# Patient Record
Sex: FEMALE | Race: WHITE | NOT HISPANIC OR LATINO | Employment: FULL TIME | URBAN - NONMETROPOLITAN AREA
[De-identification: names, ages, dates, MRNs, and addresses within clinical notes are randomized per-mention and may not be internally consistent; named-entity substitution may affect disease eponyms.]

---

## 2021-11-24 ENCOUNTER — OFFICE VISIT (OUTPATIENT)
Dept: ORTHOPEDIC SURGERY | Facility: CLINIC | Age: 61
End: 2021-11-24

## 2021-11-24 VITALS — WEIGHT: 149.9 LBS | HEIGHT: 63 IN | BODY MASS INDEX: 26.56 KG/M2

## 2021-11-24 DIAGNOSIS — M54.50 LOW BACK PAIN, UNSPECIFIED BACK PAIN LATERALITY, UNSPECIFIED CHRONICITY, UNSPECIFIED WHETHER SCIATICA PRESENT: ICD-10-CM

## 2021-11-24 DIAGNOSIS — G89.29 CHRONIC PAIN OF RIGHT KNEE: Primary | ICD-10-CM

## 2021-11-24 DIAGNOSIS — M25.561 CHRONIC PAIN OF RIGHT KNEE: Primary | ICD-10-CM

## 2021-11-24 PROCEDURE — 99203 OFFICE O/P NEW LOW 30 MIN: CPT | Performed by: ORTHOPAEDIC SURGERY

## 2021-11-24 NOTE — PROGRESS NOTES
Haily Richards is a 61 y.o. female   Primary provider:  Provider, No Known       Chief Complaint   Patient presents with   • Right Knee - Pain       HISTORY OF PRESENT ILLNESS: Patient is here today for right knee pain. She had xrays prior to visit. She states that her pain is 4/10.    61 F with 2 months acute on chronic R knee pain of insidious onset.  Symptoms initially started about 1 year ago, but have getting worse over the last 2 months.  +Swelling.  Pt describes sxs of B, L>R, lumbar radiculopathy or lumbar spinal stenosis which has led her to favor the R leg and which she thinks is probably contributing to the knee pain.  She gets pain across her lower back and running into both legs, with associated numbness and weakness.  +NSAIDs.  No PT.  No CSIs.  No sx.    Pain  This is a chronic problem. The current episode started more than 1 year ago. The problem occurs constantly. The problem has been gradually worsening. Associated symptoms include arthralgias, joint swelling and numbness. The symptoms are aggravated by walking, standing and exertion (Sitting/Driving). She has tried rest and NSAIDs for the symptoms. The treatment provided mild relief.        CONCURRENT MEDICAL HISTORY:    No past medical history on file.    Allergies   Allergen Reactions   • Codeine GI Intolerance         Current Outpatient Medications:   •  diclofenac (VOLTAREN) 50 MG EC tablet, Take 1 tablet by mouth 2 (Two) Times a Day As Needed (right knee pain)., Disp: 30 tablet, Rfl: 0    No past surgical history on file.    No family history on file.    Social History     Socioeconomic History   • Marital status: Single   Tobacco Use   • Smoking status: Current Every Day Smoker   • Smokeless tobacco: Never Used        Review of Systems   Constitutional: Negative.    HENT: Negative.    Eyes: Negative.    Respiratory: Negative.    Cardiovascular: Negative.    Gastrointestinal: Negative.    Endocrine: Negative.    Genitourinary: Negative.   "  Musculoskeletal: Positive for arthralgias and joint swelling.   Skin: Negative.    Allergic/Immunologic: Negative.    Neurological: Positive for numbness.        Tingling   Hematological: Negative.    Psychiatric/Behavioral: Positive for sleep disturbance.        Anxiety/Depression       PHYSICAL EXAMINATION:       Ht 160 cm (63\")   Wt 68 kg (149 lb 14.4 oz)   BMI 26.55 kg/m²     Physical Exam    GAIT:     []  Normal  []  Antalgic    Assistive device: []  None  []  Walker     []  Crutches  []  Cane     []  Wheelchair  []  Stretcher    Ortho Exam  NAD  R knee:  +Effusion.  +MJLT.  +Anterolateral/anteromedial joint line TTP.  0-130.  Stable to v/v stress.  Negative Lachman.  Negative Adrienne.        XR Knee 1 or 2 View Right    Result Date: 11/18/2021  Narrative: EXAM: XR KNEE 1-2 VIEWS COMPARISON: None INDICATION: pain, swelling, M25.561 Pain in right knee FINDINGS: Two-view right knee. No acute fracture or dislocation. No suspicious osseous lesion. Medial compartment joint space narrowing. Osteophytes of the tibial plateaus and medial femoral condyle. Small patellar osteophytes. No large joint effusion. Atherosclerosis of the posterior vasculature.     Impression: No acute osseous abnormality. Tricompartmental osteoarthritis of the right knee, most significant in the medial compartment. Right knee atherosclerosis. Electronically signed by:  William Carpio MD  11/18/2021 11:42 AM CST Workstation: [id number]          ASSESSMENT:    Diagnoses and all orders for this visit:    Chronic pain of right knee        PLAN  61 F with 2 months acute on chronic R knee pain and swelling.  XRs demonstrate mild to moderate OA.  This is likely an exacerbation of arthritis, possibly aggravated by worsening lumber stenosis/radiculopathy.  Rec trial of conservative mgmt with ice 20 min BID, NSAIDs, and PT.  Referral placed to PT for ROM, strengthening exercises, and modalities.  If she does not improve after 6-12 weeks of " conservative mgmt, she should RTC and will get an MRI to r/o a concomitant meniscus tear or other internal derangement.  Will also refer her to Spine Surgery for eval and treatment of lumbar stenosis/radiculopathy.  Additional referral placed to Primary Care so she can get plugged in with a regular PCP as she just recently moved here and doesn't currently have a regular doctor.  RTC prn.     No follow-ups on file.    Kaitlynn Hart MA

## 2021-12-01 ENCOUNTER — HOSPITAL ENCOUNTER (OUTPATIENT)
Dept: PHYSICAL THERAPY | Facility: HOSPITAL | Age: 61
Setting detail: THERAPIES SERIES
Discharge: HOME | End: 2021-12-01

## 2021-12-01 DIAGNOSIS — G89.29 CHRONIC PAIN OF RIGHT KNEE: Primary | ICD-10-CM

## 2021-12-01 DIAGNOSIS — M25.561 CHRONIC PAIN OF RIGHT KNEE: Primary | ICD-10-CM

## 2021-12-01 PROCEDURE — 97162 PT EVAL MOD COMPLEX 30 MIN: CPT

## 2021-12-01 NOTE — THERAPY EVALUATION
Outpatient Physical Therapy Ortho Initial Evaluation  [facility]     Patient Name: Haily Richards  : 1960  MRN: [id number]  Today's Date: 2021      Visit Date: 2021   Attendance:  (TBD approved)  Subjective Improvement: n/a  Next MD Visit: JEROD  Recert Date: 2021    Therapy Diagnosis: Chronic R knee pain      Patient Active Problem List   Diagnosis   • Right knee pain        History reviewed. No pertinent past medical history.     No past surgical history on file.     Current Outpatient Medications   Medication Instructions   • diclofenac (VOLTAREN) 50 mg, Oral, 2 Times Daily PRN       Allergies   Allergen Reactions   • Codeine GI Intolerance         Visit Dx:     ICD-10-CM ICD-9-CM   1. Chronic pain of right knee  M25.561 719.46    G89.29 338.29          Patient History     Row Name 21 0800             History    Chief Complaint Pain  -MH      Type of Pain Knee pain  Right  -      Date Current Problem(s) Began --  1 month  -      Brief Description of Current Complaint Pt stated that she started experiencing right knee pain about 1 month ago without any known STEPHY. 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 She notices increased pain with prolonged standing, walking, and while pushing/pulling her cart. 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 She is taking prescription pain medication which is helping. Single female living with her significant other. She has friends nearby who can help out if needed. Pt lives in a single story home with 2 steps to enter.  -      Patient/Caregiver Goals Relieve pain; Return to prior level of function  -      Current Tobacco Use Yes  -      Smoking Status 1 PPD  -      Patient's Rating of General Health Fair  -MH      Hand Dominance left-handed  -      Occupation/sports/leisure activities Occupation: House keeper. Hobbies: “Not anymore”   "-      What clinical tests have you had for this problem? X-ray  -      Results of Clinical Tests Xray on 11/18/2021: “Two-view right knee. No acute fracture or dislocation. No suspicious osseous lesion. Medial compartment joint space narrowing. Osteophytes of the tibial plateaus and medial femoral condyle. Small patellar osteophytes. No large joint effusion. Atherosclerosis of the posterior vasculature.”  -              Pain     Pain Location Knee  Right  -      Pain at Present 6  -      Pain at Best 6  -      Pain at Worst 10  -      Pain Frequency Constant/continuous  -      Pain Description Sharp; Throbbing  \"Like it is going to give out\"  -      What Performance Factors Make the Current Problem(s) WORSE? Walking, standing, stairs, kneeling, pushing/pulling, squatting, getting in/out of car, pressing on gas/break in car  -      What Performance Factors Make the Current Problem(s) BETTER? Pain medication  -      Tolerance Time- Standing 20-30 min  -      Tolerance Time- Walking 30 min  -      Is your sleep disturbed? Yes  -      Is medication used to assist with sleep? No  -      Difficulties at work? Walking, standing, squatting, pushing/pulling  -      Difficulties with ADL's? Dressing, cleaning  -      Difficulties with recreational activities? n/a  -              Fall Risk Assessment    Any falls in the past year: No  -            User Key  (r) = Recorded By, (t) = Taken By, (c) = Cosigned By    Initials Name Provider Type     Diana Delong PT Physical Therapist                 PT Ortho     Row Name 12/01/21 0800       Subjective Comments    Subjective Comments See therapy patient history.  -       Subjective Pain    Able to rate subjective pain? yes  -    Pre-Treatment Pain Level 6  -    Post-Treatment Pain Level 7  -       Posture/Observations    Posture/Observations Comments TTP: medial patella boarder and just medial to patella. Very mild edema medial to " patella. No increase muscle tension. Good skin integrity. No ecchymosis. = weight bearing in standing. Mild crepitus with knee flex and ext. Painful arc.  -       Knee Special Tests    Anterior drawer (ACL lesion) Right:; Negative  -    Posterior drawer (PCL lesion) Right:; Negative  -    Valgus stress (MCL lesion) Right:; Negative  -    Varus stress (LCL lesion) Right:; Negative  -    Thessaly test (meniscal lesion) Right:; Positive  -       General ROM    RT Lower Ext Comment; Rt Knee Extension/Flexion  -    LT Lower Ext Comment  -MH       Right Lower Ext    Rt Knee Extension/Flexion AROM 0-121 deg; pain  -    RT Lower Extremity Comments Hip and ankle WFL.  -       Left Lower Ext    LT Lower Extremity Comments AROM WFL grossly  -       MMT (Manual Muscle Testing)    Rt Lower Ext Comments; Rt Hip Flexion; Rt Hip Extension; Rt Hip ABduction; Rt Hip ADduction; Rt Hip Internal (Medial) Rotation; Rt Hip External (Lateral) Rotation; Rt Knee Extension; Rt Knee Flexion; Rt Ankle Dorsiflexion  -    Lt Lower Ext Comments  -       MMT Right Lower Ext    Rt Hip Flexion MMT, Gross Movement (5/5) normal  -    Rt Hip Extension MMT, Gross Movement (4+/5) good plus  -    Rt Hip ABduction MMT, Gross Movement (5/5) normal  -    Rt Hip ADduction MMT, Gross Movement (4/5) good  pain  -    Rt Hip Internal (Medial) Rotation MMT, Gross Movement (4-/5) good minus  pain  -    Rt Hip External (Lateral) Rotation MMT, Gross Movement (4/5) good  -    Rt Knee Extension MMT, Gross Movement (4+/5) good plus  -    Rt Knee Flexion MMT, Gross Movement (3+/5) fair plus  pain  -    Rt Ankle Dorsiflexion MMT, Gross Movement (5/5) normal  -       MMT Left Lower Ext    Lt Lower Extremity Comments  5/5 grossly  -       Sensation    Sensation WNL? WNL  -    Light Touch No apparent deficits  -       Flexibility    Flexibility Tested? Lower Extremity  -       Lower Extremity Flexibility    Hamstrings  Moderately limited  -    Hip Flexors Mildly limited  -MH    Quadriceps Mildly limited  -MH    Gastrocnemius Mildly limited  -MH       Balance Skills Training    SLS 10 sec with significant increase sway/balance strategies.  -       Gait/Stairs (Locomotion)    Arthur Level (Gait) independent  -    Comment (Gait/Stairs) Antalgic gait with increased lateral sway and decreased stance time on RLE.  -          User Key  (r) = Recorded By, (t) = Taken By, (c) = Cosigned By    Initials Name Provider Type    Diana Gibson PT Physical Therapist                            Therapy Education  Education Details: Findings of evaluation and plan for physical therapy.  How Provided: Verbal  Provided to: Patient  Level of Understanding: Verbalized      PT OP Goals     Row Name 12/01/21 0800          PT Short Term Goals    STG Date to Achieve 12/22/21  -     STG 1 Pt will be independent with HEP for self-management of symptoms.  -     STG 1 Progress New  -     STG 2 Pt will be able to balance in SLS on the R for 10 sec without increased balance strategies/swaying as a measure of improved balance and stability.  -     STG 2 Progress New  [facility]            Long Term Goals    LTG Date to Achieve 01/12/22  -     LTG 1 Pt will report a subjective improvement of at least 80% in symptoms as a measure to return to PLOF.  -     LTG 1 Progress New  [facility]     LTG 2 Pt’ LEFS score will improve by at least 18 points.  -     LTG 2 Progress New  [facility]     LTG 3 Pt’s knee flex/ext MMT will improve to 5/5 as a measure of improved strength.  -     LTG 3 Progress New  [facility]     LTG 4 Pt's right hip MMT will improve to 5/5 grossly as a measure of improved strength.  -     LTG 4 Progress New  [facility]     LTG 5 Pt will be able to push/pull a sled with 40# with correct body mechanics and without increased knee pain.  -     LTG 5 Progress New  [facility]            Time Calculation    PT Goal Re-Cert Due Date 12/22/21  -           User  Key  (r) = Recorded By, (t) = Taken By, (c) = Cosigned By    Initials Name Provider Type     Diana Delong, PT Physical Therapist                 PT Assessment/Plan     Row Name 12/01/21 0800          PT Assessment    Functional Limitations Impaired gait; Impaired locomotion; Limitation in home management; Limitations in community activities; Performance in self-care ADL; Performance in work activities  -     Impairments Balance; Edema; Gait; Impaired flexibility; Impaired muscle endurance; Impaired muscle length; Impaired muscle power; Muscle strength; Pain; Poor body mechanics  -     Assessment Comments Ms. Richards is a 60yo female who presents to physical therapy for right knee pain with no known STEPHY. She is currently experiencing pain/difficulty with walking, standing, squatting, getting in/out of a car, stairs, and pressing on the gas/break in a car. This has affected her ability to complete ADLs, IADLs and work duties. She demonstrates pain, TTP, edema, decreased balance, hip weakness, knee weakness, decreased flexibility, decreased balance, poor body mechanics, gait deviations, and positive special testing for possible meniscus involvement. Ms. Richards would benefit from skilled physical therapy to address the above deficits in order for her to return to her prior level of function and full work duties.  -     Rehab Potential Good  -     Patient/caregiver participated in establishment of treatment plan and goals Yes  -     Patient would benefit from skilled therapy intervention Yes  -            PT Plan    PT Frequency 2x/week  -     Predicted Duration of Therapy Intervention (PT) 4-6 weeks  -     Planned CPT's? PT EVAL MOD COMPLELITY: 19298; PT RE-EVAL: 03196; PT THER PROC EA 15 MIN: 82081; PT THER ACT EA 15 MIN: 75183; PT MANUAL THERAPY EA 15 MIN: 93297; PT NEUROMUSC RE-EDUCATION EA 15 MIN: 09841; PT GAIT TRAINING EA 15 MIN: 25208; PT SELF CARE/HOME MGMT/TRAIN EA 15: 34645; PT ELECTRICAL  STIM UNATTEND: ; PT ULTRASOUND EA 15 MIN: 94898; PT HOT/COLD PACK WC NONMCARE: 10359; PT INITIAL ORTHOTIC MGMT/TRAIN EA 15 MIN: 89289; PT SUBSEQUENT ORTHOTIC/PROSTHETIC TRAIN: 33206; PT SELF CARE/MGMT/TRAIN 15 MIN: 59668; PT THER SUPP EA 15 MIN  -     PT Plan Comments Hip strength, knee strength, stretching, balance, gait, body mechanics with sled push/pull and squatting, modalities and manual as needed.  -           User Key  (r) = Recorded By, (t) = Taken By, (c) = Cosigned By    Initials Name Provider Type    Diana Gibson PT Physical Therapist                   OP Exercises     Row Name 12/01/21 0800             Subjective Comments    Subjective Comments See therapy patient history.  -              Subjective Pain    Able to rate subjective pain? yes  -      Pre-Treatment Pain Level 6  -      Post-Treatment Pain Level 7  -            User Key  (r) = Recorded By, (t) = Taken By, (c) = Cosigned By    Initials Name Provider Type    Diana Gibson PT Physical Therapist                              Outcome Measure Options: Lower Extremity Functional Scale (LEFS)  Lower Extremity Functional Index  Any of your usual work, housework or school activities: Quite a bit of difficulty  Your usual hobbies, recreational or sporting activities: Moderate difficulty  Getting into or out of the bath: A little bit of difficulty  Walking between rooms: A little bit of difficulty  Putting on your shoes or socks: No difficulty  Squatting: Moderate difficulty  Lifting an object, like a bag of groceries from the floor: Moderate difficulty  Performing light activities around your home: Moderate difficulty  Performing heavy activities around your home: Quite a bit of difficulty  Getting into or out of a car: Quite a bit of difficulty  Walking 2 blocks: Quite a bit of difficulty  Walking a mile: Extreme difficulty or unable to perform activity  Going up or down 10 stairs (about 1 flight of stairs): Extreme  difficulty or unable to perform activity  Standing for 1 hour: Quite a bit of difficulty  Sitting for 1 hour: Quite a bit of difficulty  Running on even ground: Extreme difficulty or unable to perform activity  Running on uneven ground: Extreme difficulty or unable to perform activity  Making sharp turns while running fast: Quite a bit of difficulty  Hopping: Moderate difficulty  Rolling over in bed: Extreme difficulty or unable to perform activity  Total: 27      Time Calculation:     Start Time: 0845  Stop Time: 0925  Time Calculation (min): 40 min  Untimed Charges  PT Eval/Re-eval Minutes: 40  Total Minutes  Untimed Charges Total Minutes: 40   Total Minutes: 40     Therapy Charges for Today     Code Description Service Date Service Provider Modifiers Qty    61243071679 HC PT EVAL MOD COMPLEXITY 3 12/1/2021 Diana Delong, PT GP 1          PT G-Codes  Outcome Measure Options: Lower Extremity Functional Scale (LEFS)  Total: 27         Diana Delong PT  12/1/2021

## 2021-12-08 ENCOUNTER — HOSPITAL ENCOUNTER (OUTPATIENT)
Dept: PHYSICAL THERAPY | Facility: HOSPITAL | Age: 61
Setting detail: THERAPIES SERIES
Discharge: HOME | End: 2021-12-08

## 2021-12-08 DIAGNOSIS — G89.29 CHRONIC PAIN OF RIGHT KNEE: Primary | ICD-10-CM

## 2021-12-08 DIAGNOSIS — M25.561 CHRONIC PAIN OF RIGHT KNEE: Primary | ICD-10-CM

## 2021-12-08 PROCEDURE — 97110 THERAPEUTIC EXERCISES: CPT | Performed by: PHYSICAL THERAPIST

## 2021-12-08 NOTE — THERAPY TREATMENT NOTE
Outpatient Physical Therapy Ortho Treatment Note  [facility]     Patient Name: Haily Richards  : 1960  MRN: [id number]  Today's Date: 2021      Visit Date: 2021  Attendance: 2/2 (TBD approved)  Subjective Improvement: n/a  Next MD Visit: JEROD  Recert Date: 2021     Therapy Diagnosis: Chronic R knee pain  Visit Dx:    ICD-10-CM ICD-9-CM   1. Chronic pain of right knee  M25.561 719.46    G89.29 338.29       Patient Active Problem List   Diagnosis   • Right knee pain        No past medical history on file.     No past surgical history on file.     PT Ortho     Row Name 21 1520       Subjective Comments    Subjective Comments Off work today as a  at [email or web address]  -BS       Precautions and Contraindications    Precautions/Limitations no known precautions/limitations  -BS       Subjective Pain    Able to rate subjective pain? yes  -BS    Pre-Treatment Pain Level 3  -BS    Post-Treatment Pain Level 0  -BS          User Key  (r) = Recorded By, (t) = Taken By, (c) = Cosigned By    Initials Name Provider Type    Jovany More, PT Physical Therapist                             PT Assessment/Plan     Row Name 21 1520          PT Assessment    Assessment Comments Reduced R knee pain post tx, limited by fatigue and pain  -BS            PT Plan    PT Frequency 2x/week  -BS     Predicted Duration of Therapy Intervention (PT) 4-6 weeks  -BS     PT Plan Comments advance knee strengthening as able. Step ups, minis squats, SLS, standing knee bends.  -BS           User Key  (r) = Recorded By, (t) = Taken By, (c) = Cosigned By    Initials Name Provider Type    Jovany More, PT Physical Therapist                   OP Exercises     Row Name 21 1520             Subjective Comments    Subjective Comments Off work today as a  at [employer]  -BS              Subjective Pain    Able to rate subjective pain? yes  -BS      Pre-Treatment Pain  "Level 3  -BS      Post-Treatment Pain Level 0  -BS              Exercise 1    Exercise Name 1 Pro II, L2  -BS      Time 1 10'  -BS              Exercise 2    Exercise Name 2 Standing HS S, bilat  -BS      Sets 2 1  -BS      Reps 2 2  -BS      Time 2 30\" hold  -BS              Exercise 3    Exercise Name 3 prone knee bends, B  -BS      Sets 3 1  -BS      Reps 3 20  -BS      Additional Comments in MARYSE position w/ pillow under R knee  -BS              Exercise 4    Exercise Name 4 sit to stand, no UE A  -BS      Sets 4 1  -BS      Reps 4 10  -BS              Exercise 5    Exercise Name 5 R LAQ's  -BS      Sets 5 1  -BS      Reps 5 15  -BS              Exercise 6    Exercise Name 6 seated resisted R hamstring curls w/ green TB  -BS      Sets 6 1  -BS      Reps 6 15  -BS            User Key  (r) = Recorded By, (t) = Taken By, (c) = Cosigned By    Initials Name Provider Type    Jovany More, PT Physical Therapist                              PT OP Goals     Row Name 12/08/21 1520 12/08/21 1500       PT Short Term Goals    STG Date to Achieve 12/22/21  -BS --    STG 1 Pt will be independent with HEP for self-management of symptoms.  -BS --    STG 1 Progress Ongoing  -BS --    STG 2 Pt will be able to balance in SLS on the R for 10 sec without increased balance strategies/swaying as a measure of improved balance and stability.  -BS --    STG 2 Progress Ongoing  -BS --       Long Term Goals    LTG Date to Achieve 01/12/22  -BS --    LTG 1 Pt will report a subjective improvement of at least 80% in symptoms as a measure to return to PLOF.  -BS --    LTG 1 Progress Ongoing  -BS --    LTG 2 Pt’ LEFS score will improve by at least 18 points.  -BS --    LTG 2 Progress Ongoing  -BS --    LTG 3 Pt’s knee flex/ext MMT will improve to 5/5 as a measure of improved strength.  -BS --    LTG 3 Progress Ongoing  -BS --    LTG 4 Pt's right hip MMT will improve to 5/5 grossly as a measure of improved strength.  -BS --    LTG 4 Progress " Ongoing  -BS --    LTG 5 Pt will be able to push/pull a sled with 40# with correct body mechanics and without increased knee pain.  -BS --    LTG 5 Progress Ongoing  -BS --       Time Calculation    PT Goal Re-Cert Due Date 12/22/21  -BS 12/22/21  -BS          User Key  (r) = Recorded By, (t) = Taken By, (c) = Cosigned By    Initials Name Provider Type    Jovany More, PT Physical Therapist                               Time Calculation:   Start Time: 1520  Stop Time: 1604  Time Calculation (min): 44 min  Total Timed Code Minutes- PT: 44 minute(s)  Therapy Charges for Today     Code Description Service Date Service Provider Modifiers Qty    92076663897 HC PT THER PROC EA 15 MIN 12/8/2021 Jovany Tim, PT GP 3                    Jovany Tim, PT  12/8/2021

## 2021-12-15 ENCOUNTER — APPOINTMENT (OUTPATIENT)
Dept: PHYSICAL THERAPY | Facility: HOSPITAL | Age: 61
End: 2021-12-15

## 2021-12-22 ENCOUNTER — HOSPITAL ENCOUNTER (OUTPATIENT)
Dept: PHYSICAL THERAPY | Facility: HOSPITAL | Age: 61
Setting detail: THERAPIES SERIES
Discharge: HOME | End: 2021-12-22

## 2021-12-22 DIAGNOSIS — M25.561 CHRONIC PAIN OF RIGHT KNEE: Primary | ICD-10-CM

## 2021-12-22 DIAGNOSIS — G89.29 CHRONIC PAIN OF RIGHT KNEE: Primary | ICD-10-CM

## 2021-12-22 PROCEDURE — 97110 THERAPEUTIC EXERCISES: CPT

## 2021-12-22 PROCEDURE — G0283 ELEC STIM OTHER THAN WOUND: HCPCS

## 2021-12-22 NOTE — THERAPY TREATMENT NOTE
Outpatient Physical Therapy Ortho Treatment Note  [facility]     Patient Name: Haily Richards  : 1960  MRN: [id number]  Today's Date: 2021      Visit Date: 2021     Attendance: 3/3 (9 total)  Subjective Improvement: 0%  Next MD Visit: None Scheduled at this time  Recert Date: 2021     Therapy Diagnosis: Chronic R knee pain    Visit Dx:    ICD-10-CM ICD-9-CM   1. Chronic pain of right knee  M25.561 719.46    G89.29 338.29       Patient Active Problem List   Diagnosis   • Right knee pain        No past medical history on file.     No past surgical history on file.     PT Ortho     Row Name 21 1300       Precautions and Contraindications    Precautions/Limitations no known precautions/limitations  -       Posture/Observations    Posture/Observations Comments gait non antalgic  -          User Key  (r) = Recorded By, (t) = Taken By, (c) = Cosigned By    Initials Name Provider Type     Aida Hart PTA Physical Therapy Assistant                             PT Assessment/Plan     Row Name 21 1300          PT Assessment    Functional Limitations Impaired gait; Impaired locomotion; Limitation in home management; Limitations in community activities; Performance in self-care ADL; Performance in work activities  -     Impairments Balance; Edema; Gait; Impaired flexibility; Impaired muscle endurance; Impaired muscle length; Impaired muscle power; Muscle strength; Pain; Poor body mechanics  -     Assessment Comments increased HEP this date; given written instruction for HEP; did well with all exercises but did have some pain after stretching; initated iFC to help with pain and pt seemed to like;'  -     Rehab Potential Good  -     Patient/caregiver participated in establishment of treatment plan and goals Yes  -     Patient would benefit from skilled therapy intervention Yes  -            PT Plan    PT Frequency 2x/week  -     Predicted Duration of Therapy  "Intervention (PT) 4-6 weeks  -     PT Plan Comments COntinue with current POC: progressing hip and knee strength as able. CKC as pain allows; modalities as benefical  -           User Key  (r) = Recorded By, (t) = Taken By, (c) = Cosigned By    Initials Name Provider Type    Aida Cunha PTA Physical Therapy Assistant                 Modalities     Row Name 12/22/21 1300             Subjective Pain    Post-Treatment Pain Level 0  -KH              Ice    Ice Applied Yes  -KH      Location R knee with IFC  -      Ice S/P Rx Yes  -KH              ELECTRICAL STIMULATION    Attended/Unattended Unattended  -      Stimulation Type IFC  -      Location/Electrode Placement/Other R knee with ICE  -KH      PT E-Stim Unattended Minutes 15  -KH            User Key  (r) = Recorded By, (t) = Taken By, (c) = Cosigned By    Initials Name Provider Type    Aida Cunha PTA Physical Therapy Assistant               OP Exercises     Row Name 12/22/21 1300             Subjective Comments    Subjective Comments Patient reports that she was hurting really bad this morning; 9/10 probably because of the weather but not as bad now;  -KH              Subjective Pain    Able to rate subjective pain? yes  -KH      Pre-Treatment Pain Level 7  -KH      Post-Treatment Pain Level 0  -KH              Exercise 1    Exercise Name 1 pro ll LE strength  -KH      Time 1 10 mins  -KH      Additional Comments level 2; seat 7  -KH              Exercise 2    Exercise Name 2 quad sets  -KH      Sets 2 2  -KH      Reps 2 10  -KH      Time 2 3\" holds  -KH              Exercise 3    Exercise Name 3 ham sets  -KH      Sets 3 2  -KH      Reps 3 10  -KH      Time 3 3\" holds  -KH              Exercise 4    Exercise Name 4 SLR flexion  -KH      Sets 4 2  -KH      Reps 4 10  -KH              Exercise 5    Exercise Name 5 clam shells  -KH      Sets 5 2  -KH      Reps 5 10  -KH              Exercise 6    Exercise Name 6 reverse clam shells  -KH      Sets 6 " "2  -KH      Reps 6 10  -KH              Exercise 7    Exercise Name 7 bridges  -KH      Sets 7 2  -KH      Reps 7 10  -KH              Exercise 8    Exercise Name 8 incline stretch  -KH      Sets 8 3  -KH      Time 8 30\" holds  -KH              Exercise 9    Exercise Name 9 standing B hamstring stretch  -KH      Sets 9 3  -KH      Time 9 30\" holds  -KH            User Key  (r) = Recorded By, (t) = Taken By, (c) = Cosigned By    Initials Name Provider Type    Aida Cunha PTA Physical Therapy Assistant                              PT OP Goals     Row Name 12/22/21 1300          PT Short Term Goals    STG Date to Achieve 12/22/21  -     STG 1 Pt will be independent with HEP for self-management of symptoms.  -     STG 1 Progress Ongoing  -     STG 2 Pt will be able to balance in SLS on the R for 10 sec without increased balance strategies/swaying as a measure of improved balance and stability.  -     STG 2 Progress Ongoing  -            Long Term Goals    LTG Date to Achieve 01/12/22  -     LTG 1 Pt will report a subjective improvement of at least 80% in symptoms as a measure to return to PLOF.  -     LTG 1 Progress Ongoing  -     LTG 2 Pt’ LEFS score will improve by at least 18 points.  -     LTG 2 Progress Ongoing  -     LTG 3 Pt’s knee flex/ext MMT will improve to 5/5 as a measure of improved strength.  -     LTG 3 Progress Ongoing  -     LTG 4 Pt's right hip MMT will improve to 5/5 grossly as a measure of improved strength.  -     LTG 4 Progress Ongoing  -     LTG 5 Pt will be able to push/pull a sled with 40# with correct body mechanics and without increased knee pain.  -     LTG 5 Progress Ongoing  -            Time Calculation    PT Goal Re-Cert Due Date 12/22/21  -           User Key  (r) = Recorded By, (t) = Taken By, (c) = Cosigned By    Initials Name Provider Type    Aida Cunha PTA Physical Therapy Assistant                               Time Calculation:   Start " Time: 1300  Stop Time: 1355  Time Calculation (min): 55 min  Untimed Charges  PT E-Stim Unattended Minutes: 15  Total Minutes  Untimed Charges Total Minutes: 15   Total Minutes: 15  Therapy Charges for Today     Code Description Service Date Service Provider Modifiers Qty    18436738224 HC PT THER SUPP EA 15 MIN 12/22/2021 Aida Hart, PTA GP 1    31837271755 HC PT ELECTRICAL STIM UNATTENDED 12/22/2021 Aida Hart, PTA  1    01643733720 HC PT THER PROC EA 15 MIN 12/22/2021 Aida Hart PTA GP 3                    Aida Hart PTA  12/22/2021

## 2021-12-29 ENCOUNTER — HOSPITAL ENCOUNTER (OUTPATIENT)
Dept: PHYSICAL THERAPY | Facility: HOSPITAL | Age: 61
Setting detail: THERAPIES SERIES
Discharge: HOME | End: 2021-12-29

## 2021-12-29 DIAGNOSIS — M25.561 CHRONIC PAIN OF RIGHT KNEE: Primary | ICD-10-CM

## 2021-12-29 DIAGNOSIS — G89.29 CHRONIC PAIN OF RIGHT KNEE: Primary | ICD-10-CM

## 2021-12-29 PROCEDURE — 97110 THERAPEUTIC EXERCISES: CPT

## 2021-12-29 PROCEDURE — G0283 ELEC STIM OTHER THAN WOUND: HCPCS

## 2021-12-29 NOTE — THERAPY PROGRESS REPORT/RE-CERT
"    Outpatient Physical Therapy Ortho Progress Note  [facility]     Patient Name: Haily Richards  : 1960  MRN: [id number]  Today's Date: 2021      Visit Date: 2021     ATTENDANCE:   SUBJECTIVE IMPROVEMENT: \"improving\"  NEXT MD APPOINTMENT: none sched at this time.   RECERT DATE:     THERAPY DIAGNOSIS: R knee pain       Visit Dx:    ICD-10-CM ICD-9-CM   1. Chronic pain of right knee  M25.561 719.46    G89.29 338.29       Patient Active Problem List   Diagnosis   • Right knee pain        History reviewed. No pertinent past medical history.     No past surgical history on file.     PT Ortho     Row Name 21 1400       Subjective Comments    Subjective Comments Pt notes that knee feels about the same today. Notes that continues to have pain with walking at work however does feel it has been improving since beginning PT.  -AC       Precautions and Contraindications    Precautions/Limitations no known precautions/limitations  -AC       Subjective Pain    Able to rate subjective pain? yes  -AC    Pre-Treatment Pain Level 5  -AC       MMT Right Lower Ext    Rt Hip Flexion MMT, Gross Movement (5/5) normal  -AC    Rt Hip Extension MMT, Gross Movement (4+/5) good plus  -AC    Rt Hip ABduction MMT, Gross Movement (5/5) normal  -AC    Rt Hip ADduction MMT, Gross Movement (4/5) good  -AC    Rt Hip Internal (Medial) Rotation MMT, Gross Movement (4/5) good  -AC    Rt Hip External (Lateral) Rotation MMT, Gross Movement (4/5) good  -AC    Rt Knee Extension MMT, Gross Movement (4+/5) good plus  -AC    Rt Knee Flexion MMT, Gross Movement (4/5) good  -AC    Rt Ankle Dorsiflexion MMT, Gross Movement (5/5) normal  -AC       Lower Extremity Flexibility    Hamstrings Mildly limited  -AC          User Key  (r) = Recorded By, (t) = Taken By, (c) = Cosigned By    Initials Name Provider Type    Snow Almonte PT Physical Therapist                             PT Assessment/Plan     Row Name " 12/29/21 1500          PT Assessment    Functional Limitations Impaired gait; Impaired locomotion; Limitation in home management; Limitations in community activities; Performance in self-care ADL; Performance in work activities  -     Impairments Balance; Gait; Edema; Impaired flexibility; Muscle strength; Pain; Poor body mechanics  -AC     Assessment Comments re-evaluation copleted today wit hno goals met at this time. Pt only complete 2 treatment sessions since inital evaluation so limited progress is expected. updated HEP to include hip strength activities to continue to progress towards remaining goal . Pt ocntinues to be limited by LE strength and pain which limits funcitonal mobility. she remains approrpaite for skilled PT to improve on above deficits and return to PLOF/decrease pain.  -AC     Rehab Potential Good  -AC     Patient/caregiver participated in establishment of treatment plan and goals Yes  -AC     Patient would benefit from skilled therapy intervention Yes  -AC            PT Plan    PT Frequency 2x/week  -AC     Predicted Duration of Therapy Intervention (PT) 4-6 weeks  -AC     PT Plan Comments continue with LE stretching/strength, balance and gait training with manual and modalities as needed for pain.  -AC           User Key  (r) = Recorded By, (t) = Taken By, (c) = Cosigned By    Initials Name Provider Type    Snow Almonte, PT Physical Therapist                 Modalities     Row Name 12/29/21 1400             Ice    Ice Applied Yes  -AC      Location R knee with IFC  -AC      Ice S/P Rx Yes  -AC              ELECTRICAL STIMULATION    Attended/Unattended Unattended  -AC      Stimulation Type IFC  -AC      Location/Electrode Placement/Other R knee with ice  -AC      PT E-Stim Unattended Minutes 15  -AC            User Key  (r) = Recorded By, (t) = Taken By, (c) = Cosigned By    Initials Name Provider Type    Snow Almonte, PT Physical Therapist               OP Exercises      Row Name 12/29/21 1400             Subjective Comments    Subjective Comments Pt notes that knee feels about the same today. Notes that continues to have pain with walking at work however does feel it has been improving since beginning PT.  -AC              Subjective Pain    Able to rate subjective pain? yes  -AC      Pre-Treatment Pain Level 5  -AC              Total Minutes    01966 - PT Therapeutic Exercise Minutes 30  -AC              Exercise 1    Exercise Name 1 Pro II- L3  -AC      Time 1 10 min  -AC              Exercise 2    Exercise Name 2 standing HS stretch  -AC      Sets 2 3  -AC      Time 2 30s  -AC              Exercise 3    Exercise Name 3 incline gastroc stretch  -AC      Sets 3 3  -AC      Time 3 30s  -AC              Exercise 4    Exercise Name 4 step ups on 4 inch step- fdw  -AC      Sets 4 1  -AC      Reps 4 15  -AC              Exercise 5    Exercise Name 5 MMT- see ortho  -AC              Exercise 6    Exercise Name 6 4-way SLR  -AC      Sets 6 2  -AC      Reps 6 10  -AC              Exercise 7    Exercise Name 7 prone HS curls  -AC      Sets 7 2  -AC      Reps 7 10  -AC      Additional Comments 2# AW  -AC              Exercise 8    Exercise Name 8 hooklying hip flexor/quad stretch with strap  -AC      Sets 8 3  -AC      Time 8 30s  -AC              Exercise 9    Exercise Name 9 see modalities  -AC      Time 9 15 min  -AC            User Key  (r) = Recorded By, (t) = Taken By, (c) = Cosigned By    Initials Name Provider Type    AC Snow Freedman, PT Physical Therapist                              PT OP Goals     Row Name 12/29/21 1400          PT Short Term Goals    STG Date to Achieve 12/22/21  -     STG 1 Pt will be independent with HEP for self-management of symptoms.  -AC     STG 1 Progress Ongoing  -     STG 2 Pt will be able to balance in SLS on the R for 10 sec without increased balance strategies/swaying as a measure of improved balance and stability.  -     STG 2 Progress  Ongoing  -AC            Long Term Goals    LTG Date to Achieve 01/12/22  -AC     LTG 1 Pt will report a subjective improvement of at least 80% in symptoms as a measure to return to PLOF.  -AC     LTG 1 Progress Not Met  -AC     LTG 2 Pt’ LEFS score will improve by at least 18 points.  -AC     LTG 2 Progress Ongoing  -AC     LTG 3 Pt’s knee flex/ext MMT will improve to 5/5 as a measure of improved strength.  -AC     LTG 3 Progress Not Met  -AC     LTG 4 Pt's right hip MMT will improve to 5/5 grossly as a measure of improved strength.  -AC     LTG 4 Progress Not Met  -AC     LTG 5 Pt will be able to push/pull a sled with 40# with correct body mechanics and without increased knee pain.  -AC     LTG 5 Progress Ongoing  -AC            Time Calculation    PT Goal Re-Cert Due Date 01/19/22  -           User Key  (r) = Recorded By, (t) = Taken By, (c) = Cosigned By    Initials Name Provider Type    AC Snow Freedman, PT Physical Therapist                Therapy Education  Education Details: 4-way SLR  Given: HEP  Program: New  How Provided: Verbal, Demonstration  Provided to: Patient  Level of Understanding: Verbalized, Demonstrated              Time Calculation:   Start Time: 1430  Stop Time: 1515  Time Calculation (min): 45 min  Timed Charges  27676 - PT Therapeutic Exercise Minutes: 30  Untimed Charges  PT E-Stim Unattended Minutes: 15  Total Minutes  Timed Charges Total Minutes: 30  Untimed Charges Total Minutes: 15   Total Minutes: 45  Therapy Charges for Today     Code Description Service Date Service Provider Modifiers Qty    09844300425 HC PT THER PROC EA 15 MIN 12/29/2021 Snow Freedman, PT GP 2    51087865350 HC PT ELECTRICAL STIM UNATTENDED 12/29/2021 Snow Freedman, PT  1                    Snow Freedman PT  12/29/2021

## 2022-01-05 ENCOUNTER — APPOINTMENT (OUTPATIENT)
Dept: PHYSICAL THERAPY | Facility: HOSPITAL | Age: 62
End: 2022-01-05

## 2022-01-07 ENCOUNTER — APPOINTMENT (OUTPATIENT)
Dept: PHYSICAL THERAPY | Facility: HOSPITAL | Age: 62
End: 2022-01-07

## 2022-01-20 ENCOUNTER — APPOINTMENT (OUTPATIENT)
Dept: PHYSICAL THERAPY | Facility: HOSPITAL | Age: 62
End: 2022-01-20

## 2023-09-08 ENCOUNTER — LAB (OUTPATIENT)
Dept: LAB | Facility: HOSPITAL | Age: 63
End: 2023-09-08
Payer: MEDICAID

## 2023-09-08 ENCOUNTER — OFFICE VISIT (OUTPATIENT)
Dept: FAMILY MEDICINE CLINIC | Facility: CLINIC | Age: 63
End: 2023-09-08
Payer: MEDICAID

## 2023-09-08 VITALS
SYSTOLIC BLOOD PRESSURE: 158 MMHG | OXYGEN SATURATION: 100 % | BODY MASS INDEX: 25.87 KG/M2 | HEART RATE: 76 BPM | HEIGHT: 63 IN | DIASTOLIC BLOOD PRESSURE: 98 MMHG | WEIGHT: 146 LBS

## 2023-09-08 DIAGNOSIS — Z12.31 BREAST CANCER SCREENING BY MAMMOGRAM: ICD-10-CM

## 2023-09-08 DIAGNOSIS — M54.42 CHRONIC BILATERAL LOW BACK PAIN WITH BILATERAL SCIATICA: ICD-10-CM

## 2023-09-08 DIAGNOSIS — G89.29 CHRONIC BILATERAL LOW BACK PAIN WITH BILATERAL SCIATICA: ICD-10-CM

## 2023-09-08 DIAGNOSIS — I10 PRIMARY HYPERTENSION: ICD-10-CM

## 2023-09-08 DIAGNOSIS — M54.41 CHRONIC BILATERAL LOW BACK PAIN WITH BILATERAL SCIATICA: ICD-10-CM

## 2023-09-08 DIAGNOSIS — Z76.89 ENCOUNTER TO ESTABLISH CARE: Primary | ICD-10-CM

## 2023-09-08 DIAGNOSIS — M79.604 PAIN OF RIGHT LOWER EXTREMITY: ICD-10-CM

## 2023-09-08 DIAGNOSIS — Z12.4 CERVICAL CANCER SCREENING: ICD-10-CM

## 2023-09-08 DIAGNOSIS — Z12.11 SCREENING FOR COLON CANCER: ICD-10-CM

## 2023-09-08 DIAGNOSIS — M25.50 ARTHRALGIA, UNSPECIFIED JOINT: ICD-10-CM

## 2023-09-08 DIAGNOSIS — Z00.00 ANNUAL PHYSICAL EXAM: ICD-10-CM

## 2023-09-08 LAB
25(OH)D3 SERPL-MCNC: 29.1 NG/ML (ref 30–100)
ALBUMIN SERPL-MCNC: 4.3 G/DL (ref 3.5–5.2)
ALBUMIN/GLOB SERPL: 0.9 G/DL
ALP SERPL-CCNC: 85 U/L (ref 39–117)
ALT SERPL W P-5'-P-CCNC: 146 U/L (ref 1–33)
ANION GAP SERPL CALCULATED.3IONS-SCNC: 11.7 MMOL/L (ref 5–15)
ASO AB SERPL-ACNC: NEGATIVE [IU]/ML
AST SERPL-CCNC: 154 U/L (ref 1–32)
BASOPHILS # BLD AUTO: 0.09 10*3/MM3 (ref 0–0.2)
BASOPHILS NFR BLD AUTO: 1.4 % (ref 0–1.5)
BILIRUB SERPL-MCNC: 1 MG/DL (ref 0–1.2)
BUN SERPL-MCNC: 13 MG/DL (ref 8–23)
BUN/CREAT SERPL: 19.1 (ref 7–25)
CALCIUM SPEC-SCNC: 9.4 MG/DL (ref 8.6–10.5)
CHLORIDE SERPL-SCNC: 101 MMOL/L (ref 98–107)
CHOLEST SERPL-MCNC: 167 MG/DL (ref 0–200)
CHROMATIN AB SERPL-ACNC: 39.9 IU/ML (ref 0–14)
CO2 SERPL-SCNC: 24.3 MMOL/L (ref 22–29)
CREAT SERPL-MCNC: 0.68 MG/DL (ref 0.57–1)
CRP SERPL-MCNC: <0.3 MG/DL (ref 0–0.5)
DEPRECATED RDW RBC AUTO: 42.1 FL (ref 37–54)
EGFRCR SERPLBLD CKD-EPI 2021: 99.2 ML/MIN/1.73
EOSINOPHIL # BLD AUTO: 0.21 10*3/MM3 (ref 0–0.4)
EOSINOPHIL NFR BLD AUTO: 3.3 % (ref 0.3–6.2)
ERYTHROCYTE [DISTWIDTH] IN BLOOD BY AUTOMATED COUNT: 12.3 % (ref 12.3–15.4)
GLOBULIN UR ELPH-MCNC: 4.8 GM/DL
GLUCOSE SERPL-MCNC: 99 MG/DL (ref 65–99)
HBA1C MFR BLD: 5 % (ref 4.8–5.6)
HCT VFR BLD AUTO: 45.6 % (ref 34–46.6)
HCV AB SER DONR QL: REACTIVE
HDLC SERPL-MCNC: 53 MG/DL (ref 40–60)
HGB BLD-MCNC: 16 G/DL (ref 12–15.9)
IMM GRANULOCYTES # BLD AUTO: 0.02 10*3/MM3 (ref 0–0.05)
IMM GRANULOCYTES NFR BLD AUTO: 0.3 % (ref 0–0.5)
IRON 24H UR-MRATE: 193 MCG/DL (ref 37–145)
IRON SATN MFR SERPL: 41 % (ref 20–50)
LDLC SERPL CALC-MCNC: 100 MG/DL (ref 0–100)
LDLC/HDLC SERPL: 1.86 {RATIO}
LYMPHOCYTES # BLD AUTO: 2.38 10*3/MM3 (ref 0.7–3.1)
LYMPHOCYTES NFR BLD AUTO: 36.9 % (ref 19.6–45.3)
MCH RBC QN AUTO: 32.9 PG (ref 26.6–33)
MCHC RBC AUTO-ENTMCNC: 35.1 G/DL (ref 31.5–35.7)
MCV RBC AUTO: 93.8 FL (ref 79–97)
MONOCYTES # BLD AUTO: 0.64 10*3/MM3 (ref 0.1–0.9)
MONOCYTES NFR BLD AUTO: 9.9 % (ref 5–12)
NEUTROPHILS NFR BLD AUTO: 3.11 10*3/MM3 (ref 1.7–7)
NEUTROPHILS NFR BLD AUTO: 48.2 % (ref 42.7–76)
NRBC BLD AUTO-RTO: 0 /100 WBC (ref 0–0.2)
PLATELET # BLD AUTO: 189 10*3/MM3 (ref 140–450)
PMV BLD AUTO: 11.6 FL (ref 6–12)
POTASSIUM SERPL-SCNC: 4.3 MMOL/L (ref 3.5–5.2)
PROT SERPL-MCNC: 9.1 G/DL (ref 6–8.5)
RBC # BLD AUTO: 4.86 10*6/MM3 (ref 3.77–5.28)
SODIUM SERPL-SCNC: 137 MMOL/L (ref 136–145)
TIBC SERPL-MCNC: 472 MCG/DL (ref 298–536)
TRANSFERRIN SERPL-MCNC: 317 MG/DL (ref 200–360)
TRIGL SERPL-MCNC: 76 MG/DL (ref 0–150)
TSH SERPL DL<=0.05 MIU/L-ACNC: 3.58 UIU/ML (ref 0.27–4.2)
VIT B12 BLD-MCNC: 835 PG/ML (ref 211–946)
VLDLC SERPL-MCNC: 14 MG/DL (ref 5–40)
WBC NRBC COR # BLD: 6.45 10*3/MM3 (ref 3.4–10.8)

## 2023-09-08 PROCEDURE — 82607 VITAMIN B-12: CPT

## 2023-09-08 PROCEDURE — 86431 RHEUMATOID FACTOR QUANT: CPT

## 2023-09-08 PROCEDURE — 3008F BODY MASS INDEX DOCD: CPT | Performed by: NURSE PRACTITIONER

## 2023-09-08 PROCEDURE — 84443 ASSAY THYROID STIM HORMONE: CPT

## 2023-09-08 PROCEDURE — 82306 VITAMIN D 25 HYDROXY: CPT

## 2023-09-08 PROCEDURE — 80061 LIPID PANEL: CPT

## 2023-09-08 PROCEDURE — 84466 ASSAY OF TRANSFERRIN: CPT

## 2023-09-08 PROCEDURE — 36415 COLL VENOUS BLD VENIPUNCTURE: CPT

## 2023-09-08 PROCEDURE — 83540 ASSAY OF IRON: CPT

## 2023-09-08 PROCEDURE — 83036 HEMOGLOBIN GLYCOSYLATED A1C: CPT

## 2023-09-08 PROCEDURE — 1160F RVW MEDS BY RX/DR IN RCRD: CPT | Performed by: NURSE PRACTITIONER

## 2023-09-08 PROCEDURE — 86803 HEPATITIS C AB TEST: CPT

## 2023-09-08 PROCEDURE — 99396 PREV VISIT EST AGE 40-64: CPT | Performed by: NURSE PRACTITIONER

## 2023-09-08 PROCEDURE — 85025 COMPLETE CBC W/AUTO DIFF WBC: CPT

## 2023-09-08 PROCEDURE — 86063 ANTISTREPTOLYSIN O SCREEN: CPT

## 2023-09-08 PROCEDURE — 80053 COMPREHEN METABOLIC PANEL: CPT

## 2023-09-08 PROCEDURE — 1159F MED LIST DOCD IN RCRD: CPT | Performed by: NURSE PRACTITIONER

## 2023-09-08 PROCEDURE — 2014F MENTAL STATUS ASSESS: CPT | Performed by: NURSE PRACTITIONER

## 2023-09-08 PROCEDURE — 86038 ANTINUCLEAR ANTIBODIES: CPT

## 2023-09-08 PROCEDURE — 86140 C-REACTIVE PROTEIN: CPT

## 2023-09-08 RX ORDER — LISINOPRIL 10 MG/1
1 TABLET ORAL DAILY
COMMUNITY
Start: 2023-06-23 | End: 2023-09-08

## 2023-09-08 RX ORDER — HYDROCHLOROTHIAZIDE 12.5 MG/1
12.5 TABLET ORAL
COMMUNITY
Start: 2023-08-24 | End: 2023-09-08 | Stop reason: SDUPTHER

## 2023-09-08 RX ORDER — HYDROCHLOROTHIAZIDE 12.5 MG/1
12.5 TABLET ORAL DAILY
Qty: 30 TABLET | Refills: 11 | Status: SHIPPED | OUTPATIENT
Start: 2023-09-08

## 2023-09-08 RX ORDER — CYCLOBENZAPRINE HYDROCHLORIDE 7.5 MG/1
7.5 TABLET, FILM COATED ORAL 3 TIMES DAILY PRN
Qty: 90 TABLET | Refills: 11 | Status: SHIPPED | OUTPATIENT
Start: 2023-09-08

## 2023-09-08 RX ORDER — CYCLOBENZAPRINE HYDROCHLORIDE 7.5 MG/1
7.5 TABLET, FILM COATED ORAL 3 TIMES DAILY PRN
COMMUNITY
Start: 2023-08-24 | End: 2023-09-08 | Stop reason: SDUPTHER

## 2023-09-08 RX ORDER — ASPIRIN 81 MG/1
81 TABLET ORAL DAILY
COMMUNITY
Start: 2023-06-23 | End: 2023-09-08

## 2023-09-08 RX ORDER — HYDROCHLOROTHIAZIDE 12.5 MG/1
12.5 TABLET ORAL
Qty: 30 TABLET | Refills: 11 | Status: SHIPPED | OUTPATIENT
Start: 2023-09-08 | End: 2023-09-08 | Stop reason: SDUPTHER

## 2023-09-08 NOTE — PROGRESS NOTES
Chief Complaint  Establish Care (Joint pain. Hypertension. Dizziness and confusion )    Subjective          Haily Richards presents to [facility] [place] to establish care. Has history of hypertension as well as sciatic nerve pain. She has arthritis, has muscle cramps in her feet and right lower leg pain. She is concerned regarding some dizziness as well concerns with her memory. She states she has been walking into a room to do things, and then forgets what she walked int there to do        Hypertension  This is a chronic problem. The current episode started more than 1 year ago. The problem is unchanged. The problem is uncontrolled. Associated symptoms include malaise/fatigue. Current antihypertension treatment includes diuretics. The current treatment provides mild improvement.   Back Pain  This is a chronic problem. The current episode started more than 1 year ago. The problem occurs every several days. The problem has been waxing and waning since onset. The pain is present in the lumbar spine. The quality of the pain is described as aching. She has tried muscle relaxant for the symptoms. The treatment provided moderate relief.   Outpatient Medications Prior to Visit   Medication Sig Dispense Refill    aspirin 81 MG EC tablet Take 1 tablet by mouth Daily.      cyclobenzaprine (FEXMID) 7.5 MG tablet Take 1 tablet by mouth 3 (Three) Times a Day As Needed. for muscle spams      hydroCHLOROthiazide (HYDRODIURIL) 12.5 MG tablet Take 1 tablet by mouth Every 14 (Fourteen) Days.      lisinopril (PRINIVIL,ZESTRIL) 10 MG tablet Take 1 tablet by mouth Daily.      diclofenac (VOLTAREN) 50 MG EC tablet Take 1 tablet by mouth 2 (Two) Times a Day As Needed (right knee pain). 30 tablet 0     No facility-administered medications prior to visit.       Review of Systems   Constitutional:  Positive for malaise/fatigue.   Musculoskeletal:  Positive for back pain.       Objective  "  Vital Signs:   Visit Vitals  /98 (BP Location: Left arm, Patient Position: Sitting, Cuff Size: Adult)   Pulse 76   Ht 160 cm (62.99\")   Wt 66.2 kg (146 lb)   SpO2 100%   BMI 25.87 kg/m²     Physical Exam  Vitals and nursing note reviewed.   Constitutional:       Appearance: She is well-developed.   HENT:      Head: Normocephalic and atraumatic.   Eyes:      General: Lids are normal.      Conjunctiva/sclera: Conjunctivae normal.   Neck:      Thyroid: No thyroid mass or thyromegaly.      Trachea: Trachea normal. No tracheal tenderness.   Cardiovascular:      Rate and Rhythm: Normal rate.      Pulses: Normal pulses.      Heart sounds: Normal heart sounds.   Pulmonary:      Effort: Pulmonary effort is normal. No respiratory distress.      Breath sounds: Normal breath sounds. No wheezing.   Abdominal:      General: There is no distension.      Palpations: Abdomen is soft. There is no mass.   Musculoskeletal:         General: Normal range of motion.      Cervical back: Normal range of motion. No edema.   Skin:     General: Skin is warm and dry.      Coloration: Skin is not pale.      Findings: No abrasion, erythema or lesion.   Neurological:      Mental Status: She is alert and oriented to person, place, and time.   Psychiatric:         Mood and Affect: Mood is not anxious. Affect is not inappropriate.         Speech: Speech normal.         Behavior: Behavior normal.         Thought Content: Thought content normal.         Judgment: Judgment normal. Judgment is not impulsive.      Result Review :                 Assessment and Plan    Diagnoses and all orders for this visit:    1. Encounter to establish care (Primary)    2. Annual physical exam    3. Chronic bilateral low back pain with bilateral sciatica  -     XR Spine Lumbar 4+ View; Future  -     TSH; Future  -     Vitamin D,25-Hydroxy; Future  -     Comprehensive Metabolic Panel; Future  -     Hemoglobin A1c; Future  -     CBC & Differential; Future  -     " Vitamin B12; Future  -     Hepatitis C Antibody; Future  -     Lipid Panel; Future  -     Antistreptolysin O screen; Future  -     Rheumatoid Factor; Future  -     C-reactive protein; Future  -     JIM; Future    4. Cervical cancer screening  -     Ambulatory Referral to Obstetrics / Gynecology  -     TSH; Future  -     Vitamin D,25-Hydroxy; Future  -     Comprehensive Metabolic Panel; Future  -     Hemoglobin A1c; Future  -     CBC & Differential; Future  -     Vitamin B12; Future  -     Hepatitis C Antibody; Future  -     Lipid Panel; Future    5. Breast cancer screening by mammogram  -     Mammo screening digital tomosynthesis bilateral w CAD; Future  -     TSH; Future  -     Vitamin D,25-Hydroxy; Future  -     Comprehensive Metabolic Panel; Future  -     Hemoglobin A1c; Future  -     CBC & Differential; Future  -     Vitamin B12; Future  -     Hepatitis C Antibody; Future  -     Lipid Panel; Future    6. Screening for colon cancer  -     Ambulatory Referral For Screening Colonoscopy  -     TSH; Future  -     Vitamin D,25-Hydroxy; Future  -     Comprehensive Metabolic Panel; Future  -     Hemoglobin A1c; Future  -     CBC & Differential; Future  -     Vitamin B12; Future  -     Hepatitis C Antibody; Future  -     Lipid Panel; Future    7. Primary hypertension  -     TSH; Future  -     Vitamin D,25-Hydroxy; Future  -     Comprehensive Metabolic Panel; Future  -     Hemoglobin A1c; Future  -     CBC & Differential; Future  -     Vitamin B12; Future  -     Hepatitis C Antibody; Future  -     Lipid Panel; Future  -     Discontinue: hydroCHLOROthiazide (HYDRODIURIL) 12.5 MG tablet; Take 1 tablet by mouth Every 14 (Fourteen) Days.  Dispense: 30 tablet; Refill: 11    8. Pain of right lower extremity  -     TSH; Future  -     Vitamin D,25-Hydroxy; Future  -     Comprehensive Metabolic Panel; Future  -     Hemoglobin A1c; Future  -     CBC & Differential; Future  -     Vitamin B12; Future  -     Hepatitis C Antibody;  Future  -     Lipid Panel; Future  -     Antistreptolysin O screen; Future  -     Rheumatoid Factor; Future  -     C-reactive protein; Future  -     JIM; Future  -     Iron Profile; Future    9. Arthralgia, unspecified joint  -     Antistreptolysin O screen; Future  -     Rheumatoid Factor; Future  -     C-reactive protein; Future  -     JIM; Future  -     cyclobenzaprine (FEXMID) 7.5 MG tablet; Take 1 tablet by mouth 3 (Three) Times a Day As Needed for Muscle Spasms. for muscle spams  Dispense: 90 tablet; Refill: 11  -     Iron Profile; Future      Complete ordered lab work   We will call with results  Pending lab results we will discuss further treatment plan and monitoring     The current medical regimen is effective;  continue present plan and medications.      Subjective       She exercises regularly: yes.  Healthy Diet:yes.  She wears her seat belt:yes.  She has concerns about domestic violence: no.      She would not like to be screened for STD's at today's exam.     OB History    No obstetric history on file.           She is taking Vit D and Calcium:yes  Last colonoscopy or FIT test: Ordered    Last PAP: n/a  Last Mammo: Ordered    Immunization status: up to date and documented.        The following portions of the patient's history were reviewed and updated as appropriate:problem list, current medications, allergies, past family history, past medical history, past social history, and past surgical history.        Follow Up   Return in about 4 weeks (around 10/6/2023), or if symptoms worsen or fail to improve.  Patient was given instructions and counseling regarding her condition or for health maintenance advice. Please see specific information pulled into the AVS if appropriate.           This document has been electronically signed by JAIME Gipson on September 11, 2023 08:25 CDT

## 2023-09-11 LAB — ANA SER QL: NEGATIVE

## 2023-09-12 ENCOUNTER — PREP FOR SURGERY (OUTPATIENT)
Dept: OTHER | Facility: HOSPITAL | Age: 63
End: 2023-09-12
Payer: MEDICAID

## 2023-09-12 ENCOUNTER — TELEPHONE (OUTPATIENT)
Dept: FAMILY MEDICINE CLINIC | Facility: CLINIC | Age: 63
End: 2023-09-12
Payer: MEDICAID

## 2023-09-12 DIAGNOSIS — R76.8 HEPATITIS C ANTIBODY TEST POSITIVE: Primary | ICD-10-CM

## 2023-09-12 DIAGNOSIS — M25.50 ARTHRALGIA, UNSPECIFIED JOINT: ICD-10-CM

## 2023-09-12 DIAGNOSIS — R74.8 ELEVATED LIVER ENZYMES: ICD-10-CM

## 2023-09-12 DIAGNOSIS — Z12.11 SCREEN FOR COLON CANCER: Primary | ICD-10-CM

## 2023-09-12 DIAGNOSIS — R76.8 RHEUMATOID FACTOR POSITIVE: ICD-10-CM

## 2023-09-12 RX ORDER — DEXTROSE AND SODIUM CHLORIDE 5; .45 G/100ML; G/100ML
100 INJECTION, SOLUTION INTRAVENOUS CONTINUOUS PRN
Status: CANCELLED | OUTPATIENT
Start: 2023-09-18

## 2023-09-18 ENCOUNTER — HOSPITAL ENCOUNTER (OUTPATIENT)
Facility: HOSPITAL | Age: 63
Setting detail: HOSPITAL OUTPATIENT SURGERY
Discharge: HOME | End: 2023-09-18
Attending: SURGERY | Admitting: SURGERY
Payer: MEDICAID

## 2023-09-18 ENCOUNTER — ANESTHESIA EVENT (OUTPATIENT)
Dept: GASTROENTEROLOGY | Facility: HOSPITAL | Age: 63
End: 2023-09-18
Payer: MEDICAID

## 2023-09-18 ENCOUNTER — ANESTHESIA (OUTPATIENT)
Dept: GASTROENTEROLOGY | Facility: HOSPITAL | Age: 63
End: 2023-09-18
Payer: MEDICAID

## 2023-09-18 VITALS
HEART RATE: 80 BPM | WEIGHT: 139 LBS | OXYGEN SATURATION: 99 % | SYSTOLIC BLOOD PRESSURE: 159 MMHG | BODY MASS INDEX: 24.63 KG/M2 | HEIGHT: 63 IN | TEMPERATURE: 97.7 F | DIASTOLIC BLOOD PRESSURE: 95 MMHG | RESPIRATION RATE: 16 BRPM

## 2023-09-18 DIAGNOSIS — Z12.11 SCREEN FOR COLON CANCER: ICD-10-CM

## 2023-09-18 PROCEDURE — 25010000002 PROPOFOL 10 MG/ML EMULSION: Performed by: NURSE ANESTHETIST, CERTIFIED REGISTERED

## 2023-09-18 PROCEDURE — 45380 COLONOSCOPY AND BIOPSY: CPT | Performed by: SURGERY

## 2023-09-18 RX ORDER — DEXTROSE AND SODIUM CHLORIDE 5; .45 G/100ML; G/100ML
100 INJECTION, SOLUTION INTRAVENOUS CONTINUOUS PRN
Status: DISCONTINUED | OUTPATIENT
Start: 2023-09-18 | End: 2023-09-18 | Stop reason: HOSPADM

## 2023-09-18 RX ORDER — LIDOCAINE HYDROCHLORIDE 20 MG/ML
INJECTION, SOLUTION EPIDURAL; INFILTRATION; INTRACAUDAL; PERINEURAL AS NEEDED
Status: DISCONTINUED | OUTPATIENT
Start: 2023-09-18 | End: 2023-09-18 | Stop reason: SURG

## 2023-09-18 RX ORDER — PROPOFOL 10 MG/ML
VIAL (ML) INTRAVENOUS AS NEEDED
Status: DISCONTINUED | OUTPATIENT
Start: 2023-09-18 | End: 2023-09-18 | Stop reason: SURG

## 2023-09-18 RX ADMIN — PROPOFOL 100 MG: 10 INJECTION, EMULSION INTRAVENOUS at 10:15

## 2023-09-18 RX ADMIN — PROPOFOL 100 MG: 10 INJECTION, EMULSION INTRAVENOUS at 10:12

## 2023-09-18 RX ADMIN — PROPOFOL 50 MG: 10 INJECTION, EMULSION INTRAVENOUS at 10:21

## 2023-09-18 RX ADMIN — PROPOFOL 50 MG: 10 INJECTION, EMULSION INTRAVENOUS at 10:27

## 2023-09-18 RX ADMIN — DEXTROSE AND SODIUM CHLORIDE 100 ML/HR: 5; 450 INJECTION, SOLUTION INTRAVENOUS at 09:02

## 2023-09-18 RX ADMIN — LIDOCAINE HYDROCHLORIDE 50 MG: 20 INJECTION, SOLUTION EPIDURAL; INFILTRATION; INTRACAUDAL; PERINEURAL at 10:09

## 2023-09-18 RX ADMIN — PROPOFOL 50 MG: 10 INJECTION, EMULSION INTRAVENOUS at 10:31

## 2023-09-18 RX ADMIN — PROPOFOL 100 MG: 10 INJECTION, EMULSION INTRAVENOUS at 10:09

## 2023-09-18 RX ADMIN — PROPOFOL 100 MG: 10 INJECTION, EMULSION INTRAVENOUS at 10:18

## 2023-09-18 RX ADMIN — PROPOFOL 50 MG: 10 INJECTION, EMULSION INTRAVENOUS at 10:24

## 2023-09-18 NOTE — ANESTHESIA PREPROCEDURE EVALUATION
Anesthesia Evaluation     Patient summary reviewed and Nursing notes reviewed   NPO Solid Status: > 8 hours  NPO Liquid Status: > 4 hours           Airway   Mallampati: II  No difficulty expected  Dental    (+) poor dentition    Pulmonary - normal exam   (+) a smoker Current Smoked day of surgery,  Cardiovascular     Rhythm: regular  Rate: normal    (+) hypertension well controlled      Neuro/Psych- negative ROS  GI/Hepatic/Renal/Endo    (+) hepatitis C, liver disease    Musculoskeletal (-) negative ROS    Abdominal    Substance History      OB/GYN          Other                        Anesthesia Plan    ASA 3     general   total IV anesthesia  intravenous induction     Anesthetic plan, risks, benefits, and alternatives have been provided, discussed and informed consent has been obtained with: patient.    Plan discussed with CRNA.      CODE STATUS:          Consent: The patient's consent was obtained including but not limited to risks of crusting, scabbing, blistering, scarring, darker or lighter pigmentary change, recurrence, incomplete removal and infection. Render Note In Bullet Format When Appropriate: No Post-Care Instructions: I reviewed with the patient in detail post-care instructions. Patient is to wear sunprotection, and avoid picking at any of the treated lesions. Pt may apply Vaseline to crusted or scabbing areas. Detail Level: Detailed Duration Of Freeze Thaw-Cycle (Seconds): 1

## 2023-09-18 NOTE — H&P
"No chief complaint on file.      Haily Richards is a 62 y.o. female referred today for evaluation for colonoscopy.  She notes no change in bowel habits, no blood in the stool.     Prior Colonoscopy:no  Prior Polyps:no  Family History of Colon Cancer:no first degree relatives, grandmother did had colon cancer  On anticoagulation:no    Past Surgical History:   Procedure Laterality Date   •  SECTION     • HERNIA REPAIR       Past Medical History:   Diagnosis Date   • Cervical cancer    • Hepatitis C     pt states \"possibly\"   • Hypertension      Social History     Socioeconomic History   • Marital status: Single   Tobacco Use   • Smoking status: Every Day   • Smokeless tobacco: Never   Substance and Sexual Activity   • Alcohol use: Yes     Comment: occasional   • Drug use: Never   • Sexual activity: Defer     Family History   Problem Relation Age of Onset   • Diabetes Mother    • Cancer Mother    • Stroke Father    • Cancer Father    • Heart disease Father    • Cancer Sister    • Cancer Daughter      Allergies   Allergen Reactions   • Codeine GI Intolerance       Home Medications:  Prior to Admission medications    Medication Sig Start Date End Date Taking? Authorizing Provider   cyclobenzaprine (FEXMID) 7.5 MG tablet Take 1 tablet by mouth 3 (Three) Times a Day As Needed for Muscle Spasms. for muscle spams 23  Yes Hanna Guadarrama APRN   hydroCHLOROthiazide (HYDRODIURIL) 12.5 MG tablet Take 1 tablet by mouth Daily. 23  Yes Hanna Guadarrama APRN       Review of Systems   Constitutional: Negative.    HENT:  Negative for hearing loss, nosebleeds and trouble swallowing.    Respiratory:  Negative for apnea, chest tightness and shortness of breath.    Cardiovascular:  Negative for chest pain and palpitations.   Gastrointestinal:  Negative for abdominal distention, abdominal pain, blood in stool, constipation, diarrhea, nausea and vomiting.   Genitourinary:  Negative for difficulty urinating, dysuria, " frequency and urgency.   Musculoskeletal:  Negative for back pain, joint swelling and neck pain.   Skin:  Negative for rash.   Neurological:  Negative for dizziness, seizures, weakness, light-headedness, numbness and headaches.   Hematological:  Negative for adenopathy.   Psychiatric/Behavioral:  Negative for agitation. The patient is not nervous/anxious.      Vitals:    09/18/23 0849   BP: 159/95   Pulse: 80   Resp: 16   Temp: 97.7 °F (36.5 °C)   SpO2: 99%       Physical Exam  Constitutional:       General: She is not in acute distress.     Appearance: She is well-developed.   HENT:      Head: Normocephalic and atraumatic.   Eyes:      General: No scleral icterus.     Conjunctiva/sclera: Conjunctivae normal.   Neck:      Thyroid: No thyromegaly.      Trachea: No tracheal deviation.   Cardiovascular:      Rate and Rhythm: Normal rate and regular rhythm.      Heart sounds: Normal heart sounds. No murmur heard.    No friction rub. No gallop.   Pulmonary:      Effort: Pulmonary effort is normal. No respiratory distress.      Breath sounds: Normal breath sounds. No stridor. No wheezing or rales.   Chest:      Chest wall: No tenderness.   Abdominal:      General: Bowel sounds are normal. There is no distension.      Palpations: Abdomen is soft. There is no mass.      Tenderness: There is no abdominal tenderness. There is no guarding or rebound.      Hernia: No hernia is present.   Musculoskeletal:         General: No deformity. Normal range of motion.      Cervical back: Normal range of motion and neck supple.   Lymphadenopathy:      Cervical: No cervical adenopathy.   Skin:     General: Skin is warm and dry.      Coloration: Skin is not pale.      Findings: No erythema or rash.      Nails: There is no clubbing.   Neurological:      Mental Status: She is alert and oriented to person, place, and time.   Psychiatric:         Behavior: Behavior normal.         Thought Content: Thought content normal.     Assessment     In  need of screening colonoscopy.    Plan     Risks, benefits, rationale and prep for colonoscopy have been discussed with the patient.  The patient indicates understanding of these issues and agrees with the plan.

## 2023-09-18 NOTE — ANESTHESIA POSTPROCEDURE EVALUATION
Patient: Haily Richards    Procedure Summary       Date: 09/18/23 Room / Location: [facility] ENDOSCOPY 2 / [facility] ENDOSCOPY    Anesthesia Start: 0954 Anesthesia Stop: 1033    Procedure: COLONOSCOPY Diagnosis:       Screen for colon cancer      (Screen for colon cancer [Z12.11])    Surgeons: Ryan Dwyer MD Provider: Noah Armijo CRNA    Anesthesia Type: general ASA Status: 3            Anesthesia Type: general    Vitals  No vitals data found for the desired time range.          Post Anesthesia Care and Evaluation    Patient location during evaluation: bedside  Patient participation: complete - patient participated  Level of consciousness: sleepy but conscious  Pain score: 0  Pain management: adequate    Airway patency: patent  Anesthetic complications: No anesthetic complications  PONV Status: none  Cardiovascular status: acceptable  Respiratory status: acceptable  Hydration status: acceptable    Comments: ---------------------------               09/18/23                      1033         ---------------------------   BP:          159/95         Pulse:         80           Resp:          16           Temp:   97.7 °F (36.5 °C)   SpO2:          99%         ---------------------------

## 2023-09-19 ENCOUNTER — OFFICE VISIT (OUTPATIENT)
Dept: SURGERY | Facility: CLINIC | Age: 63
End: 2023-09-19
Payer: MEDICAID

## 2023-09-19 VITALS
BODY MASS INDEX: 26.05 KG/M2 | TEMPERATURE: 96.9 F | HEART RATE: 68 BPM | WEIGHT: 147 LBS | SYSTOLIC BLOOD PRESSURE: 124 MMHG | DIASTOLIC BLOOD PRESSURE: 80 MMHG | HEIGHT: 63 IN

## 2023-09-19 DIAGNOSIS — L98.9 SKIN LESIONS: Primary | ICD-10-CM

## 2023-09-19 LAB — REF LAB TEST METHOD: NORMAL

## 2023-09-19 PROCEDURE — 1160F RVW MEDS BY RX/DR IN RCRD: CPT | Performed by: SURGERY

## 2023-09-19 PROCEDURE — 11200 RMVL SKIN TAGS UP TO&INC 15: CPT | Performed by: SURGERY

## 2023-09-19 PROCEDURE — 1159F MED LIST DOCD IN RCRD: CPT | Performed by: SURGERY

## 2023-09-19 RX ORDER — SODIUM CHLORIDE 9 MG/ML
40 INJECTION, SOLUTION INTRAVENOUS AS NEEDED
OUTPATIENT
Start: 2023-09-19

## 2023-09-19 RX ORDER — SODIUM CHLORIDE 0.9 % (FLUSH) 0.9 %
10 SYRINGE (ML) INJECTION EVERY 12 HOURS SCHEDULED
OUTPATIENT
Start: 2023-09-19

## 2023-09-19 RX ORDER — SODIUM CHLORIDE 0.9 % (FLUSH) 0.9 %
10 SYRINGE (ML) INJECTION AS NEEDED
OUTPATIENT
Start: 2023-09-19

## 2023-09-19 NOTE — PROGRESS NOTES
"62-year-old female who underwent her first ever screening colonoscopy yesterday and was noted to have perianal skin lesions possible anal condyloma.  She says she has had these for years.  They seem to have increased in number over the years.  They sometimes bother her when she has diarrhea and has to wipe her self and they can become irritated and sometimes bleed.  No issues with her  that she is aware of.  She was told when she lived in [place] that these were benign skin lesions and she was recommended to use a cream which she could not afford so she did not use anything.  She says these lesions have been there for years otherwise.  Colonoscopy was significant for a small polyp removed from the rectum pathology which is pending at this time.  Patient presents now for perianal exam here in the clinic and likely biopsy of these skin lesions.    Vitals:    09/19/23 0902   BP: 124/80   Pulse: 68   Temp: 96.9 °F (36.1 °C)       Allergies:   Allergies   Allergen Reactions    Codeine GI Intolerance       Home Medications:  Prior to Admission medications    Medication Sig Start Date End Date Taking? Authorizing Provider   cyclobenzaprine (FEXMID) 7.5 MG tablet Take 1 tablet by mouth 3 (Three) Times a Day As Needed for Muscle Spasms. for muscle spams 9/8/23  Yes Hanna Guadarrama APRN   hydroCHLOROthiazide (HYDRODIURIL) 12.5 MG tablet Take 1 tablet by mouth Daily. 9/8/23  Yes Hanna Guadarrama APRN       Social History     Socioeconomic History    Marital status: Single   Tobacco Use    Smoking status: Every Day    Smokeless tobacco: Never   Vaping Use    Vaping Use: Never used   Substance and Sexual Activity    Alcohol use: Yes     Comment: occasional    Drug use: Never    Sexual activity: Defer       Past Medical History:   Diagnosis Date    Cervical cancer     Hepatitis C     pt states \"possibly\"    Hypertension        Family History   Problem Relation Age of Onset    Diabetes Mother     Cancer Mother     Stroke " Father     Cancer Father     Heart disease Father     Cancer Sister     Cancer Daughter        Past Surgical History:   Procedure Laterality Date     SECTION      HERNIA REPAIR       Review of systems  Denies chest pain  Denies shortness of breath  Denies palpitations  Denies abdominal pain  Denies nausea and vomiting  Denies any urinary complaints  Denies fever chills  Does not take any anticoagulants  Denies any bleeding issues   Denies history of DVT  No history of asthma  No history of seizures    Physical Exam  Constitutional:       General: She is not in acute distress.     Appearance: She is well-developed. She is not ill-appearing or toxic-appearing.   HENT:      Head: Normocephalic and atraumatic.      Nose: Nose normal.   Eyes:      General: No scleral icterus.     Conjunctiva/sclera: Conjunctivae normal.   Neck:      Thyroid: No thyromegaly.      Trachea: No tracheal deviation.   Cardiovascular:      Rate and Rhythm: Normal rate and regular rhythm.      Heart sounds: Normal heart sounds. No murmur heard.    No friction rub. No gallop.   Pulmonary:      Effort: Pulmonary effort is normal. No respiratory distress.      Breath sounds: Normal breath sounds. No stridor. No wheezing or rales.   Chest:      Chest wall: No tenderness.   Abdominal:      General: Bowel sounds are normal. There is no distension.      Palpations: Abdomen is soft. There is no mass.      Tenderness: There is no abdominal tenderness. There is no guarding or rebound.      Hernia: No hernia is present.   Musculoskeletal:         General: No deformity. Normal range of motion.      Cervical back: Normal range of motion and neck supple.   Lymphadenopathy:      Cervical: No cervical adenopathy.   Skin:     General: Skin is warm and dry.      Coloration: Skin is not pale.      Findings: No erythema or rash.      Nails: There is no clubbing.   Neurological:      Mental Status: She is alert and oriented to person, place, and time.    Psychiatric:         Behavior: Behavior normal.         Thought Content: Thought content normal.   Perianal area has wartlike lesions both sides of her anus highly suggestive of anal warts.  No ulceration no pigmentation did not appear to be any lesions above the dentate line.    Recommended to the patient that we go ahead and do a biopsy of a couple of these lesions to determine the diagnosis.  We will offer to do that here in the office under local and she clearly understands and agrees and wishes to proceed with biopsy.  She was placed left side down right side up and the left side of her anus was prepped and draped with Betadine.  We infiltrated local and a good block was obtained.  We then excised 2 of these lesions with scissors at the skin level.  Each incision was approximately 5 mm in length.  Hemostasis was assured pressure.  Specimens were sent to pathology clearly labeled.    Assessment and plan  These lesions by the patient she wished to have them excised and I clinically suspect that these may be anal condyloma.  Biopsy will tell us if they are condyloma but even if they are not the patient wished to have them removed.  We will go ahead and set her up tentatively to have these done in the operating room.  I fully discussed excision of these skin lesions  with the patient.  She clearly understands the procedure alternatives risk and benefits and wishes to proceed.  We will also follow-up the polypectomy from her colonoscopy and her next visit.

## 2023-09-20 LAB — REF LAB TEST METHOD: NORMAL

## 2023-09-22 ENCOUNTER — PRE-ADMISSION TESTING (OUTPATIENT)
Dept: PREADMISSION TESTING | Facility: HOSPITAL | Age: 63
End: 2023-09-22

## 2023-09-22 VITALS
RESPIRATION RATE: 18 BRPM | OXYGEN SATURATION: 97 % | BODY MASS INDEX: 26.4 KG/M2 | HEART RATE: 81 BPM | SYSTOLIC BLOOD PRESSURE: 138 MMHG | DIASTOLIC BLOOD PRESSURE: 74 MMHG | WEIGHT: 149 LBS | HEIGHT: 63 IN

## 2023-09-22 LAB
APTT PPP: 27 SECONDS (ref 20–40.3)
INR PPP: 1.04 (ref 0.8–1.2)
PROTHROMBIN TIME: 13.6 SECONDS (ref 11.1–15.3)
QT INTERVAL: 416 MS
QTC INTERVAL: 477 MS

## 2023-09-22 PROCEDURE — 85730 THROMBOPLASTIN TIME PARTIAL: CPT

## 2023-09-22 PROCEDURE — 93005 ELECTROCARDIOGRAM TRACING: CPT

## 2023-09-22 PROCEDURE — 36415 COLL VENOUS BLD VENIPUNCTURE: CPT

## 2023-09-22 PROCEDURE — 85610 PROTHROMBIN TIME: CPT

## 2023-09-22 RX ORDER — SODIUM CHLORIDE, SODIUM GLUCONATE, SODIUM ACETATE, POTASSIUM CHLORIDE AND MAGNESIUM CHLORIDE 526; 502; 368; 37; 30 MG/100ML; MG/100ML; MG/100ML; MG/100ML; MG/100ML
1000 INJECTION, SOLUTION INTRAVENOUS CONTINUOUS PRN
OUTPATIENT
Start: 2023-09-22

## 2023-09-22 RX ORDER — ALBUTEROL SULFATE 90 UG/1
2 AEROSOL, METERED RESPIRATORY (INHALATION) EVERY 4 HOURS PRN
COMMUNITY

## 2023-09-28 ENCOUNTER — OFFICE VISIT (OUTPATIENT)
Dept: OBSTETRICS AND GYNECOLOGY | Facility: CLINIC | Age: 63
End: 2023-09-28
Payer: MEDICAID

## 2023-09-28 ENCOUNTER — ANESTHESIA EVENT (OUTPATIENT)
Dept: PERIOP | Facility: HOSPITAL | Age: 63
End: 2023-09-28
Payer: MEDICAID

## 2023-09-28 ENCOUNTER — HOSPITAL ENCOUNTER (OUTPATIENT)
Facility: HOSPITAL | Age: 63
Setting detail: HOSPITAL OUTPATIENT SURGERY
Discharge: HOME | End: 2023-09-28
Attending: SURGERY | Admitting: SURGERY
Payer: MEDICAID

## 2023-09-28 ENCOUNTER — ANESTHESIA (OUTPATIENT)
Dept: PERIOP | Facility: HOSPITAL | Age: 63
End: 2023-09-28
Payer: MEDICAID

## 2023-09-28 VITALS
WEIGHT: 147 LBS | BODY MASS INDEX: 26.05 KG/M2 | SYSTOLIC BLOOD PRESSURE: 129 MMHG | HEIGHT: 63 IN | DIASTOLIC BLOOD PRESSURE: 79 MMHG

## 2023-09-28 VITALS
HEART RATE: 76 BPM | DIASTOLIC BLOOD PRESSURE: 79 MMHG | OXYGEN SATURATION: 94 % | HEIGHT: 63 IN | BODY MASS INDEX: 26.02 KG/M2 | RESPIRATION RATE: 20 BRPM | WEIGHT: 146.83 LBS | TEMPERATURE: 97.7 F | SYSTOLIC BLOOD PRESSURE: 129 MMHG

## 2023-09-28 DIAGNOSIS — L98.9 SKIN LESIONS: ICD-10-CM

## 2023-09-28 DIAGNOSIS — Z01.419 ENCOUNTER FOR WELL WOMAN EXAM WITH ROUTINE GYNECOLOGICAL EXAM: Primary | ICD-10-CM

## 2023-09-28 PROCEDURE — 25010000002 PROPOFOL 200 MG/20ML EMULSION: Performed by: NURSE ANESTHETIST, CERTIFIED REGISTERED

## 2023-09-28 PROCEDURE — 2014F MENTAL STATUS ASSESS: CPT

## 2023-09-28 PROCEDURE — 46922 EXCISION OF ANAL LESION(S): CPT | Performed by: SURGERY

## 2023-09-28 PROCEDURE — 1160F RVW MEDS BY RX/DR IN RCRD: CPT

## 2023-09-28 PROCEDURE — 46922 EXCISION OF ANAL LESION(S): CPT | Performed by: SPECIALIST/TECHNOLOGIST, OTHER

## 2023-09-28 PROCEDURE — 1159F MED LIST DOCD IN RCRD: CPT

## 2023-09-28 PROCEDURE — 25010000002 MIDAZOLAM PER 1 MG: Performed by: NURSE ANESTHETIST, CERTIFIED REGISTERED

## 2023-09-28 PROCEDURE — 25010000002 FENTANYL CITRATE (PF) 100 MCG/2ML SOLUTION: Performed by: NURSE ANESTHETIST, CERTIFIED REGISTERED

## 2023-09-28 PROCEDURE — 99396 PREV VISIT EST AGE 40-64: CPT

## 2023-09-28 PROCEDURE — 25010000002 NEOSTIGMINE 10 MG/10ML SOLUTION: Performed by: NURSE ANESTHETIST, CERTIFIED REGISTERED

## 2023-09-28 PROCEDURE — 3008F BODY MASS INDEX DOCD: CPT

## 2023-09-28 RX ORDER — SODIUM CHLORIDE, SODIUM GLUCONATE, SODIUM ACETATE, POTASSIUM CHLORIDE AND MAGNESIUM CHLORIDE 526; 502; 368; 37; 30 MG/100ML; MG/100ML; MG/100ML; MG/100ML; MG/100ML
1000 INJECTION, SOLUTION INTRAVENOUS CONTINUOUS PRN
Status: DISCONTINUED | OUTPATIENT
Start: 2023-09-28 | End: 2023-09-28 | Stop reason: HOSPADM

## 2023-09-28 RX ORDER — SODIUM CHLORIDE 9 MG/ML
40 INJECTION, SOLUTION INTRAVENOUS AS NEEDED
Status: DISCONTINUED | OUTPATIENT
Start: 2023-09-28 | End: 2023-09-28 | Stop reason: HOSPADM

## 2023-09-28 RX ORDER — LABETALOL HYDROCHLORIDE 5 MG/ML
5 INJECTION, SOLUTION INTRAVENOUS
Status: DISCONTINUED | OUTPATIENT
Start: 2023-09-28 | End: 2023-09-28 | Stop reason: HOSPADM

## 2023-09-28 RX ORDER — LIDOCAINE HYDROCHLORIDE 20 MG/ML
INJECTION, SOLUTION EPIDURAL; INFILTRATION; INTRACAUDAL; PERINEURAL AS NEEDED
Status: DISCONTINUED | OUTPATIENT
Start: 2023-09-28 | End: 2023-09-28 | Stop reason: SURG

## 2023-09-28 RX ORDER — ACETAMINOPHEN 650 MG/1
650 SUPPOSITORY RECTAL ONCE AS NEEDED
Status: DISCONTINUED | OUTPATIENT
Start: 2023-09-28 | End: 2023-09-28 | Stop reason: HOSPADM

## 2023-09-28 RX ORDER — DIPHENHYDRAMINE HYDROCHLORIDE 50 MG/ML
12.5 INJECTION INTRAMUSCULAR; INTRAVENOUS
Status: DISCONTINUED | OUTPATIENT
Start: 2023-09-28 | End: 2023-09-28 | Stop reason: HOSPADM

## 2023-09-28 RX ORDER — FENTANYL CITRATE 50 UG/ML
INJECTION, SOLUTION INTRAMUSCULAR; INTRAVENOUS AS NEEDED
Status: DISCONTINUED | OUTPATIENT
Start: 2023-09-28 | End: 2023-09-28 | Stop reason: SURG

## 2023-09-28 RX ORDER — HYDROCODONE BITARTRATE AND ACETAMINOPHEN 7.5; 325 MG/1; MG/1
1 TABLET ORAL EVERY 6 HOURS PRN
Qty: 10 TABLET | Refills: 0 | Status: SHIPPED | OUTPATIENT
Start: 2023-09-28

## 2023-09-28 RX ORDER — EPHEDRINE SULFATE 50 MG/ML
5 INJECTION, SOLUTION INTRAVENOUS ONCE AS NEEDED
Status: DISCONTINUED | OUTPATIENT
Start: 2023-09-28 | End: 2023-09-28 | Stop reason: HOSPADM

## 2023-09-28 RX ORDER — NEOSTIGMINE METHYLSULFATE 1 MG/ML
INJECTION, SOLUTION INTRAVENOUS AS NEEDED
Status: DISCONTINUED | OUTPATIENT
Start: 2023-09-28 | End: 2023-09-28 | Stop reason: SURG

## 2023-09-28 RX ORDER — DIPHENHYDRAMINE HCL 25 MG
25 CAPSULE ORAL
Status: DISCONTINUED | OUTPATIENT
Start: 2023-09-28 | End: 2023-09-28 | Stop reason: HOSPADM

## 2023-09-28 RX ORDER — PROPOFOL 10 MG/ML
INJECTION, EMULSION INTRAVENOUS AS NEEDED
Status: DISCONTINUED | OUTPATIENT
Start: 2023-09-28 | End: 2023-09-28 | Stop reason: SURG

## 2023-09-28 RX ORDER — MIDAZOLAM HYDROCHLORIDE 1 MG/ML
INJECTION INTRAMUSCULAR; INTRAVENOUS AS NEEDED
Status: DISCONTINUED | OUTPATIENT
Start: 2023-09-28 | End: 2023-09-28 | Stop reason: SURG

## 2023-09-28 RX ORDER — LIDOCAINE HCL/EPINEPHRINE/PF 2%-1:200K
VIAL (ML) INJECTION AS NEEDED
Status: DISCONTINUED | OUTPATIENT
Start: 2023-09-28 | End: 2023-09-28 | Stop reason: HOSPADM

## 2023-09-28 RX ORDER — ONDANSETRON 2 MG/ML
4 INJECTION INTRAMUSCULAR; INTRAVENOUS ONCE AS NEEDED
Status: DISCONTINUED | OUTPATIENT
Start: 2023-09-28 | End: 2023-09-28 | Stop reason: HOSPADM

## 2023-09-28 RX ORDER — IPRATROPIUM BROMIDE AND ALBUTEROL SULFATE 2.5; .5 MG/3ML; MG/3ML
3 SOLUTION RESPIRATORY (INHALATION) ONCE AS NEEDED
Status: DISCONTINUED | OUTPATIENT
Start: 2023-09-28 | End: 2023-09-28 | Stop reason: HOSPADM

## 2023-09-28 RX ORDER — ACETAMINOPHEN 325 MG/1
650 TABLET ORAL ONCE AS NEEDED
Status: DISCONTINUED | OUTPATIENT
Start: 2023-09-28 | End: 2023-09-28 | Stop reason: HOSPADM

## 2023-09-28 RX ORDER — SODIUM CHLORIDE 0.9 % (FLUSH) 0.9 %
10 SYRINGE (ML) INJECTION AS NEEDED
Status: DISCONTINUED | OUTPATIENT
Start: 2023-09-28 | End: 2023-09-28 | Stop reason: HOSPADM

## 2023-09-28 RX ORDER — ROCURONIUM BROMIDE 10 MG/ML
INJECTION, SOLUTION INTRAVENOUS AS NEEDED
Status: DISCONTINUED | OUTPATIENT
Start: 2023-09-28 | End: 2023-09-28 | Stop reason: SURG

## 2023-09-28 RX ORDER — HYDRALAZINE HYDROCHLORIDE 20 MG/ML
5 INJECTION INTRAMUSCULAR; INTRAVENOUS
Status: DISCONTINUED | OUTPATIENT
Start: 2023-09-28 | End: 2023-09-28 | Stop reason: HOSPADM

## 2023-09-28 RX ORDER — NALOXONE HCL 0.4 MG/ML
0.4 VIAL (ML) INJECTION AS NEEDED
Status: DISCONTINUED | OUTPATIENT
Start: 2023-09-28 | End: 2023-09-28 | Stop reason: HOSPADM

## 2023-09-28 RX ORDER — SODIUM CHLORIDE 0.9 % (FLUSH) 0.9 %
10 SYRINGE (ML) INJECTION EVERY 12 HOURS SCHEDULED
Status: DISCONTINUED | OUTPATIENT
Start: 2023-09-28 | End: 2023-09-28 | Stop reason: HOSPADM

## 2023-09-28 RX ADMIN — PROPOFOL 150 MG: 10 INJECTION, EMULSION INTRAVENOUS at 07:09

## 2023-09-28 RX ADMIN — NEOSTIGMINE METHYLSULFATE 3 MG: 0.5 INJECTION INTRAVENOUS at 07:48

## 2023-09-28 RX ADMIN — FENTANYL CITRATE 50 MCG: 50 INJECTION, SOLUTION INTRAMUSCULAR; INTRAVENOUS at 07:32

## 2023-09-28 RX ADMIN — PROPOFOL 50 MG: 10 INJECTION, EMULSION INTRAVENOUS at 07:20

## 2023-09-28 RX ADMIN — GLYCOPYRROLATE 0.4 MCG: 0.2 INJECTION, SOLUTION INTRAMUSCULAR; INTRAVITREAL at 07:48

## 2023-09-28 RX ADMIN — ROCURONIUM BROMIDE 30 MG: 50 INJECTION INTRAVENOUS at 07:10

## 2023-09-28 RX ADMIN — MIDAZOLAM HYDROCHLORIDE 2 MG: 1 INJECTION, SOLUTION INTRAMUSCULAR; INTRAVENOUS at 07:01

## 2023-09-28 RX ADMIN — LIDOCAINE HYDROCHLORIDE 60 MG: 20 INJECTION, SOLUTION EPIDURAL; INFILTRATION; INTRACAUDAL; PERINEURAL at 07:09

## 2023-09-28 RX ADMIN — SODIUM CHLORIDE, SODIUM GLUCONATE, SODIUM ACETATE, POTASSIUM CHLORIDE AND MAGNESIUM CHLORIDE 1000 ML: 526; 502; 368; 37; 30 INJECTION, SOLUTION INTRAVENOUS at 05:42

## 2023-09-28 RX ADMIN — FENTANYL CITRATE 50 MCG: 50 INJECTION, SOLUTION INTRAMUSCULAR; INTRAVENOUS at 07:06

## 2023-09-28 NOTE — ANESTHESIA PROCEDURE NOTES
Airway  Urgency: elective    Date/Time: 9/28/2023 7:14 AM  Airway not difficult    General Information and Staff    Patient location during procedure: OR  CRNA/CAA: Jackeline Arnold, WAYLON    Indications and Patient Condition  Indications for airway management: airway protection    Preoxygenated: yes  Mask difficulty assessment: 2 - vent by mask + OA or adjuvant +/- NMBA    Final Airway Details  Final airway type: endotracheal airway      Successful airway: ETT  Cuffed: yes   Successful intubation technique: direct laryngoscopy  Facilitating devices/methods: intubating stylet  Endotracheal tube insertion site: oral  Blade: Aidee  Blade size: 3  ETT size (mm): 7.0  Cormack-Lehane Classification: grade I - full view of glottis  Placement verified by: chest auscultation and capnometry   Cuff volume (mL): 7  Measured from: lips  ETT/EBT  to lips (cm): 18  Number of attempts at approach: 1  Assessment: lips, teeth, and gum same as pre-op and atraumatic intubation

## 2023-09-28 NOTE — ANESTHESIA POSTPROCEDURE EVALUATION
Patient: Haily Richards    Procedure Summary       Date: 09/28/23 Room / Location: [facility] OR  / [facility] OR    Anesthesia Start: 0704 Anesthesia Stop: 0809    Procedure: EXAMINED PERIANAL AREA UNDER ANESTHESIA  AND EXCISE LESIONS , POSSIBLE FULGURATION (Perirectal) Diagnosis:       Skin lesions      (Skin lesions [L98.9])    Surgeons: Ryan Dwyer MD Provider: Jackeline Arnold CRNA    Anesthesia Type: general ASA Status: 2            Anesthesia Type: general    Vitals  Vitals Value Taken Time   /85 09/28/23 0806   Temp 97.8 °F (36.6 °C) 09/28/23 0806   Pulse 107 09/28/23 0806   Resp 16 09/28/23 0806   SpO2 99 % 09/28/23 0806           Post Anesthesia Care and Evaluation    Patient location during evaluation: PACU  Patient participation: complete - patient participated  Level of consciousness: sleepy but conscious and responsive to verbal stimuli  Pain management: adequate    Airway patency: patent  Anesthetic complications: No anesthetic complications  PONV Status: none  Cardiovascular status: acceptable and hemodynamically stable  Respiratory status: acceptable, face mask and spontaneous ventilation  Hydration status: acceptable    Comments: ---------------------------               09/28/23                      0806         ---------------------------   BP:          146/85         Pulse:         107          Resp:          16           Temp:   97.8 °F (36.6 °C)   SpO2:          99%         ---------------------------  No anesthesia care post op

## 2023-09-28 NOTE — PROGRESS NOTES
Subjective   Haily Richards is a 62 y.o. Annual gynecological exam     History of Present Illness  Postmenopausal  Pap: Over 8 years ago, nl per pt report   Mammo: Over 10 years ago, nl per pt report     Patient presents today for an annual gynecological exam. She is having her mammogram today also. She did have a minor procedure in the OR to remove some perianal lesions.         Gynecologic Exam  The patient's pertinent negatives include no genital itching, genital lesions, genital odor, genital rash, missed menses, pelvic pain, vaginal bleeding or vaginal discharge. Pertinent negatives include no abdominal pain, chills, constipation, diarrhea, dysuria, fever, flank pain, frequency, headaches, hematuria, nausea, urgency or vomiting. She is postmenopausal. Her past medical history is significant for a  section. There is no history of endometriosis, ovarian cysts or an STD.     The following portions of the patient's history were reviewed and updated as appropriate: allergies, current medications, past family history, past medical history, past social history, past surgical history, and problem list.    Review of Systems   Constitutional:  Negative for appetite change, chills, fatigue and fever.   Respiratory:  Negative for apnea, cough, choking, chest tightness, shortness of breath, wheezing and stridor.    Cardiovascular:  Negative for chest pain, palpitations and leg swelling.   Gastrointestinal:  Negative for abdominal pain, constipation, diarrhea, nausea and vomiting.   Genitourinary:  Negative for amenorrhea, breast discharge, breast lump, breast pain, decreased libido, decreased urine volume, difficulty urinating, dyspareunia, dysuria, flank pain, frequency, genital sores, hematuria, menstrual problem, missed menses, pelvic pain, pelvic pressure, urgency, urinary incontinence, vaginal bleeding, vaginal discharge and vaginal pain.     Objective   Physical Exam  Vitals reviewed. Exam conducted with  a chaperone present.   Constitutional:       General: She is awake. She is not in acute distress.     Appearance: Normal appearance. She is well-developed, well-groomed and normal weight. She is not ill-appearing, toxic-appearing or diaphoretic.   Cardiovascular:      Rate and Rhythm: Normal rate and regular rhythm.      Pulses: Normal pulses.      Heart sounds: Normal heart sounds.   Pulmonary:      Effort: Pulmonary effort is normal.      Breath sounds: Normal breath sounds.   Chest:      Comments: Declined breast exam   Abdominal:      General: Bowel sounds are normal.      Hernia: There is no hernia in the left inguinal area or right inguinal area.   Genitourinary:     General: Normal vulva.      Exam position: Lithotomy position.      Pubic Area: No rash or pubic lice.       Tony stage (genital): 5.      Labia:         Right: No rash, tenderness, lesion or injury.         Left: No rash, tenderness, lesion or injury.       Vagina: Normal.      Cervix: Normal.      Uterus: Normal.       Adnexa: Right adnexa normal and left adnexa normal.      Comments: Pap collected   Lymphadenopathy:      Lower Body: No right inguinal adenopathy. No left inguinal adenopathy.   Skin:     General: Skin is warm and dry.   Neurological:      Mental Status: She is alert and easily aroused.   Psychiatric:         Behavior: Behavior is cooperative.         Assessment & Plan   Diagnoses and all orders for this visit:    1. Encounter for well woman exam with routine gynecological exam (Primary)  -     LIQUID-BASED PAP SMEAR WITH HPV GENOTYPING REGARDLESS OF INTERPRETATION ([facility])      Reviewed preventative screening recommendations. Patient educated and encouraged to do monthly self breast exams. Mammogram today 9/28/23. If pap smear is normal patient will receive a letter in the mail in about two weeks. If pap smear is abnormal we will call the patient and follow up with a plan. If pap smear is normal recommend to repeat pap in  5 years.         This document has been electronically signed by JAIME Thapa on September 28, 2023 09:57 CDT

## 2023-09-28 NOTE — INTERVAL H&P NOTE
H&P reviewed. The patient was examined and there are no changes to the H&P.  Bx was seborrheic keratosis.  Lesions bother patient and become irritated and sometimes bleed so she wishes to proceed with excision.      Temp:  [97.8 °F (36.6 °C)] 97.8 °F (36.6 °C)  Heart Rate:  [69] 69  Resp:  [18] 18  BP: (152)/(87) 152/87

## 2023-09-28 NOTE — OP NOTE
EXAM UNDER ANESTHESIA  Procedure Note    Haily Richards  9/28/2023    Pre-op Diagnosis:   Skin lesions [L98.9]    Post-op Diagnosis:     Post-Op Diagnosis Codes:     * Skin lesions [L98.9]    Procedure/CPT® Codes:      Procedure(s):  EXAMINED PERIANAL AREA UNDER ANESTHESIA  AND EXCISE LESIONS , FULGURATION    Surgeon(s):  Ryan Dwyer MD    Anesthesia: General    Staff:   Circulator: Lola Mojica RN; Taisha Marlow RN  Scrub Person: Lucia Burger; Jamel Lutz  Assistant: Libby Mane CSA    Assistant: Libby Mane CSA was responsible for performing the following activities: Retraction, Suction, Irrigation, Closing, and Placing Dressing and their skilled assistance was necessary for the success of this case.     Estimated Blood Loss: minimal    Specimens:                ID Type Source Tests Collected by Time   A (Not marked as sent) : Right Side Perianal Lesions Tissue Anus TISSUE EXAM, [facility] ([facility]) Ryan Dwyer MD 9/28/2023 0738   B (Not marked as sent) : Left Side Perianal Lesions Tissue Anus TISSUE EXAM, [facility] ([facility]) Ryan Dwyer MD 9/28/2023 0746         Drains: * No LDAs found *    Indications: 62-year-old female presents with symptomatic perianal skin lesions.  Biopsy in the office demonstrated these to be seborrheic keratosis.  These lesions intermittently get irritated and bleed and burn and patient wished to have them excised.    Findings: Multiple small lesions consistent with seborrheic keratosis inferior to the dentate line.  All able to be removed with scalpel and a few were cauterized    Complications: None    Procedure: The patient was brought to the operating room where she was placed under general anesthesia and was placed prone in forrest-knife position. She had SCDs in place. All pressure points were padded and supported. Perianal area was prepped and draped in normal sterile fashion and appropriate timeout was taken. Exam under  anesthesia was performed. Lesions all appeared to be inferior to the dentate line.  Local was injected bilaterally around the anus.  We used a scalpel and excised the majority of these lesions. For very tiny lesions we cauterized. These were shave biopsies of the skin where they were excised. This was on both sides. We sent each side separately for pathologic purposes. Silvadene was used as a dressing. The patient was then awakened, extubated and transferred to the recovery room in awake and stable condition.               Disposition: Transfer to recovery in stable condition        Ryan Dwyer MD     Date: 9/28/2023  Time: 08:00 CDT

## 2023-09-28 NOTE — INTERVAL H&P NOTE
H&P reviewed. The patient was examined and there are no changes to the H&P.  Polyp on colonoscopy was tubular adenoma.  Went over this with patient and recommend cscope repeat in 3 years.      Temp:  [97.8 °F (36.6 °C)] 97.8 °F (36.6 °C)  Heart Rate:  [69] 69  Resp:  [18] 18  BP: (152)/(87) 152/87

## 2023-09-28 NOTE — ANESTHESIA PREPROCEDURE EVALUATION
Anesthesia Evaluation     Patient summary reviewed and Nursing notes reviewed   no history of anesthetic complications:   NPO Solid Status: > 8 hours  NPO Liquid Status: > 8 hours           Airway   Mallampati: I  TM distance: >3 FB  Neck ROM: full  Dental    (+) upper dentures    Pulmonary     breath sounds clear to auscultation  (+) a smoker Current Abstained day of surgery,  (-) COPD, asthma, shortness of breath, sleep apnea, rhonchi, decreased breath sounds, wheezes, rales  Cardiovascular   Exercise tolerance: poor (<4 METS)    ECG reviewed  Rhythm: regular  Rate: normal    (+) hypertension less than 2 medications  (-) past MI, dysrhythmias, angina, murmur, cardiac stents, DVT    ROS comment: [id number]  Normal sinus rhythm  Septal infarct , age undetermined  Abnormal ECG  No previous ECGs available      Neuro/Psych  (-) seizures, TIA, CVA  GI/Hepatic/Renal/Endo    (+) hepatitis C, liver disease  (-)  obesity, GERD, no renal disease, diabetes    Musculoskeletal     Abdominal   (-) obese   Substance History   (-) alcohol use, drug use     OB/GYN    (-)  Pregnant        Other   arthritis,   history of cancer (cervical, 30 years ago) remission    ROS/Med Hx Other: Uses albuterol inhaler for bronchitis per pt has not used for a couple of months.                   Anesthesia Plan    ASA 2     general     intravenous induction     Anesthetic plan, risks, benefits, and alternatives have been provided, discussed and informed consent has been obtained with: patient.      CODE STATUS:

## 2023-10-02 LAB — REF LAB TEST METHOD: NORMAL

## 2023-10-03 LAB — REF LAB TEST METHOD: NORMAL

## 2023-10-04 ENCOUNTER — PREP FOR SURGERY (OUTPATIENT)
Dept: OTHER | Facility: HOSPITAL | Age: 63
End: 2023-10-04
Payer: MEDICAID

## (undated) DEVICE — GLV SURG SIGNATURE ESSENTIAL PF LTX SZ6.5

## (undated) DEVICE — SINGLE-USE BIOPSY FORCEPS: Brand: RADIAL JAW 4

## (undated) DEVICE — STERILE POLYISOPRENE POWDER-FREE SURGICAL GLOVES: Brand: PROTEXIS

## (undated) DEVICE — PATIENT RETURN ELECTRODE, SINGLE-USE, CONTACT QUALITY MONITORING, ADULT, WITH 9FT CORD, FOR PATIENTS WEIGING OVER 33LBS. (15KG): Brand: MEGADYNE

## (undated) DEVICE — SOL IRR NACL 0.9PCT BT 1000ML

## (undated) DEVICE — PK MAJ PROC LF 60

## (undated) DEVICE — STERILE POLYISOPRENE POWDER-FREE SURGICAL GLOVES WITH EMOLLIENT COATING: Brand: PROTEXIS

## (undated) DEVICE — CANN SMPL SOFTECH BIFLO ETCO2 A/M 7FT

## (undated) DEVICE — GLV SURG SIGNATURE ESSENTIAL PF LTX SZ7

## (undated) DEVICE — TBG PENCL TELESCP MEGADYNE SMOKE EVAC 10FT

## (undated) DEVICE — CONTAINER,SPECIMEN,OR STERILE,4OZ: Brand: MEDLINE